# Patient Record
Sex: MALE | Race: WHITE | NOT HISPANIC OR LATINO | Employment: FULL TIME | URBAN - METROPOLITAN AREA
[De-identification: names, ages, dates, MRNs, and addresses within clinical notes are randomized per-mention and may not be internally consistent; named-entity substitution may affect disease eponyms.]

---

## 2017-10-16 ENCOUNTER — ALLSCRIPTS OFFICE VISIT (OUTPATIENT)
Dept: OTHER | Facility: OTHER | Age: 32
End: 2017-10-16

## 2017-10-16 LAB
BILIRUB UR QL STRIP: NORMAL
CLARITY UR: NORMAL
COLOR UR: YELLOW
GLUCOSE (HISTORICAL): NORMAL
HGB UR QL STRIP.AUTO: NORMAL
KETONES UR STRIP-MCNC: NORMAL MG/DL
LEUKOCYTE ESTERASE UR QL STRIP: NORMAL
NITRITE UR QL STRIP: NORMAL
PH UR STRIP.AUTO: 6.5 [PH]
PROT UR STRIP-MCNC: NORMAL MG/DL
SP GR UR STRIP.AUTO: 1.02
UROBILINOGEN UR QL STRIP.AUTO: 0.2

## 2017-11-14 ENCOUNTER — GENERIC CONVERSION - ENCOUNTER (OUTPATIENT)
Dept: OTHER | Facility: OTHER | Age: 32
End: 2017-11-14

## 2017-11-14 ENCOUNTER — ALLSCRIPTS OFFICE VISIT (OUTPATIENT)
Dept: OTHER | Facility: OTHER | Age: 32
End: 2017-11-14

## 2017-11-14 DIAGNOSIS — Z30.2 ENCOUNTER FOR STERILIZATION: ICD-10-CM

## 2017-11-14 PROCEDURE — 88302 TISSUE EXAM BY PATHOLOGIST: CPT | Performed by: UROLOGY

## 2017-11-15 ENCOUNTER — LAB REQUISITION (OUTPATIENT)
Dept: LAB | Facility: HOSPITAL | Age: 32
End: 2017-11-15
Payer: COMMERCIAL

## 2017-11-15 DIAGNOSIS — Z30.2 ENCOUNTER FOR STERILIZATION: ICD-10-CM

## 2017-12-26 DIAGNOSIS — Z30.8 ENCOUNTER FOR OTHER CONTRACEPTIVE MANAGEMENT: ICD-10-CM

## 2018-01-12 VITALS
SYSTOLIC BLOOD PRESSURE: 128 MMHG | BODY MASS INDEX: 40.43 KG/M2 | WEIGHT: 315 LBS | DIASTOLIC BLOOD PRESSURE: 88 MMHG | HEIGHT: 74 IN

## 2018-01-22 VITALS
SYSTOLIC BLOOD PRESSURE: 138 MMHG | DIASTOLIC BLOOD PRESSURE: 84 MMHG | HEIGHT: 74 IN | BODY MASS INDEX: 40.43 KG/M2 | WEIGHT: 315 LBS

## 2018-01-26 ENCOUNTER — TRANSCRIBE ORDERS (OUTPATIENT)
Dept: LAB | Facility: HOSPITAL | Age: 33
End: 2018-01-26

## 2018-01-26 ENCOUNTER — LAB (OUTPATIENT)
Dept: LAB | Facility: HOSPITAL | Age: 33
End: 2018-01-26
Attending: UROLOGY
Payer: COMMERCIAL

## 2018-01-26 DIAGNOSIS — Z30.8 ENCOUNTER FOR OTHER CONTRACEPTIVE MANAGEMENT: ICD-10-CM

## 2018-01-26 LAB
COMMENT POST VAS: ABNORMAL
PH SMN: 8.5 [PH] (ref 7.2–8.6)
POST  VAS COLLECTION: ABNORMAL
SPECIMEN VOL SMN: 4 ML (ref 1–5)
VISC SMN: 2 CP (ref 3–4)
WBC SMN QL: 2 "HPF"

## 2018-01-26 PROCEDURE — 89321 SEMEN ANAL SPERM DETECTION: CPT

## 2018-01-26 NOTE — PROGRESS NOTES
Inform pt that the  test was normal  Keep usual follow up appt  If this is his second neg Sa, he can be cleared

## 2018-01-30 ENCOUNTER — TELEPHONE (OUTPATIENT)
Dept: UROLOGY | Facility: AMBULATORY SURGERY CENTER | Age: 33
End: 2018-01-30

## 2018-02-14 DIAGNOSIS — Z30.8 ENCOUNTER FOR OTHER CONTRACEPTIVE MANAGEMENT: ICD-10-CM

## 2018-09-17 ENCOUNTER — OFFICE VISIT (OUTPATIENT)
Dept: UROLOGY | Facility: MEDICAL CENTER | Age: 33
End: 2018-09-17
Payer: COMMERCIAL

## 2018-09-17 VITALS
DIASTOLIC BLOOD PRESSURE: 90 MMHG | BODY MASS INDEX: 40.43 KG/M2 | HEIGHT: 74 IN | WEIGHT: 315 LBS | SYSTOLIC BLOOD PRESSURE: 120 MMHG

## 2018-09-17 DIAGNOSIS — N48.9 PENILE LESION: Primary | ICD-10-CM

## 2018-09-17 LAB
SL AMB  POCT GLUCOSE, UA: ABNORMAL
SL AMB LEUKOCYTE ESTERASE,UA: ABNORMAL
SL AMB POCT BILIRUBIN,UA: ABNORMAL
SL AMB POCT BLOOD,UA: ABNORMAL
SL AMB POCT CLARITY,UA: CLEAR
SL AMB POCT COLOR,UA: YELLOW
SL AMB POCT KETONES,UA: ABNORMAL
SL AMB POCT NITRITE,UA: ABNORMAL
SL AMB POCT PH,UA: 5.5
SL AMB POCT SPECIFIC GRAVITY,UA: 1.03
SL AMB POCT URINE PROTEIN: ABNORMAL
SL AMB POCT UROBILINOGEN: 0.2

## 2018-09-17 PROCEDURE — 81003 URINALYSIS AUTO W/O SCOPE: CPT | Performed by: UROLOGY

## 2018-09-17 PROCEDURE — 99214 OFFICE O/P EST MOD 30 MIN: CPT | Performed by: UROLOGY

## 2018-09-17 RX ORDER — ZOLPIDEM TARTRATE 10 MG/1
TABLET ORAL
COMMUNITY
Start: 2018-08-13

## 2018-09-17 RX ORDER — LISDEXAMFETAMINE DIMESYLATE 30 MG/1
20 CAPSULE ORAL 2 TIMES DAILY
COMMUNITY
Start: 2018-08-13 | End: 2020-10-19

## 2018-09-17 RX ORDER — BETAMETHASONE DIPROPIONATE 0.5 MG/G
OINTMENT TOPICAL 2 TIMES DAILY
Qty: 30 G | Refills: 0 | Status: SHIPPED | OUTPATIENT
Start: 2018-09-17 | End: 2019-06-25 | Stop reason: SDUPTHER

## 2018-09-17 NOTE — ASSESSMENT & PLAN NOTE
Likely papular lichen planus  Will treat empirically with steroid cream   If not better he will require derm consult or biopsy

## 2018-09-17 NOTE — PATIENT INSTRUCTIONS
Betamethasone Dipropionate (On the skin)   Betamethasone Dipropionate (bay-ta-METH-a-sone dye-PROE-pee-oh-bk)  Treats skin pain, swelling, and itching  This medicine is a corticosteroid  Brand Name(s):   There may be other brand names for this medicine  When This Medicine Should Not Be Used: This medicine is not right for everyone  Do not use it if you had an allergic reaction to betamethasone or to similar medicines  How to Use This Medicine:   Cream, Foam, Gel/Jelly, Lotion, Ointment, Spray  · Use this medicine as directed  · Use this medicine only on your skin  Rinse it off right away if it gets on a cut or scrape  Do not get the medicine in your eyes, nose, or mouth  · Wash your hands with soap and water before and after you use this medicine  · Cream, gel, lotion, or ointment: Apply a thin layer of the medicine to the affected area  Rub it in gently  · Aerosol foam: Turn the can upside down and squirt a small amount onto a small plate or other cool surface  Then  small amounts of the foam and massage it into your scalp  Do not squirt the foam onto your hands because it will melt if you hold it too long  · Do not cover the treated area with a bandage unless directed by your doctor  · Missed dose: Apply a dose as soon as you can  If it is almost time for your next dose, wait until then and apply a regular dose  Do not apply extra medicine to make up for a missed dose  · Store the medicine at room temperature, away from heat and direct light  · The aerosol foam is flammable  Do not use it near heat, open flame, or while smoking  Do not puncture, break, or burn the aerosol can  Drugs and Foods to Avoid:      Ask your doctor or pharmacist before using any other medicine, including over-the-counter medicines, vitamins, and herbal products  Warnings While Using This Medicine:   · Tell your doctor if you are pregnant or breastfeeding    · This medicine may cause adrenal gland problems, such as Cushing syndrome or high blood sugar  · Do not use this medicine to treat a skin problem your doctor has not examined  · If your symptoms do not improve after 2 weeks of treatment, or if they get worse, check with your doctor  · Keep all medicine out of the reach of children  Never share your medicine with anyone  Possible Side Effects While Using This Medicine:   Call your doctor right away if you notice any of these side effects:  · Allergic reaction: Itching or hives, swelling in your face or hands, swelling or tingling in your mouth or throat, chest tightness, trouble breathing  · Color changes on the skin, dark freckles, easy bruising, muscle weakness  · Severe itching, burning, or skin irritation  · Signs of skin infection, such as redness, swelling, drainage, or pus  · Weight gain around your neck, upper back, breast, face, or waist  If you notice these less serious side effects, talk with your doctor:   · Mild stinging, burning, itching, redness, or dryness at the application site  If you notice other side effects that you think are caused by this medicine, tell your doctor  Call your doctor for medical advice about side effects  You may report side effects to FDA at 0-700-FDA-0730  © 2017 [employer] Information is for End User's use only and may not be sold, redistributed or otherwise used for commercial purposes  The above information is an  only  It is not intended as medical advice for individual conditions or treatments  Talk to your doctor, nurse or pharmacist before following any medical regimen to see if it is safe and effective for you

## 2018-09-17 NOTE — PROGRESS NOTES
Assessment/Plan:    Penile lesion  Likely papular lichen planus  Will treat empirically with steroid cream   If not better he will require derm consult or biopsy  Diagnoses and all orders for this visit:    Penile lesion  -     POCT urine dip auto non-scope  -     betamethasone, augmented, (DIPROLENE) 0 05 % ointment; Apply topically 2 (two) times a day    Other orders  -     VYVANSE 30 MG capsule; Earliest Fill Date: 8/13/18   -     zolpidem (AMBIEN) 10 mg tablet;           Subjective:      Patient ID: Young Bowles is a 34 y o  male  Chief complaint:  Penile lesion    79-year-old male who underwent vasectomy last year who returns with a penile lesion  This is been present for the last 2-3 weeks  It is at times itchy  No fever, chills, new exposures are noted  He is voiding well  There is no gross hematuria, dysuria or symptoms of infection  There is no urethral discharge  The following portions of the patient's history were reviewed and updated as appropriate: allergies, current medications, past family history, past medical history, past social history, past surgical history and problem list     Review of Systems   Constitutional: Negative for chills, diaphoresis, fatigue and fever  HENT: Negative  Eyes: Negative  Respiratory: Negative  Cardiovascular: Negative  Endocrine: Negative  Genitourinary: Negative  Musculoskeletal: Negative  Skin: Negative  Allergic/Immunologic: Negative  Neurological: Negative  Hematological: Negative  Psychiatric/Behavioral: Negative  Objective:      /90 (BP Location: Left arm, Patient Position: Sitting)   Ht 6' 2" (1 88 m)   Wt (!) 158 kg (348 lb)   BMI 44 68 kg/m²          Physical Exam   Constitutional: He is oriented to person, place, and time  He appears well-developed and well-nourished  HENT:   Head: Normocephalic and atraumatic  Eyes: Conjunctivae are normal    Neck: Neck supple     Cardiovascular: Normal rate  Pulmonary/Chest: Effort normal    Abdominal: He exhibits no distension and no mass  There is no tenderness  There is no rebound and no guarding  No hernia   Genitourinary:   Genitourinary Comments: Penis is circumcised  Well deep yaw aided, flat, papular lesions on the glans penis- mildly erythematous  Lesions are also noted on his circumcision line on the left side of the shaft  No blisters, erosions or discharge  Testes descended and normal to palpation   Neurological: He is alert and oriented to person, place, and time  Skin: Skin is warm and dry  Psychiatric: He has a normal mood and affect   His behavior is normal  Judgment and thought content normal

## 2019-06-24 ENCOUNTER — TELEPHONE (OUTPATIENT)
Dept: UROLOGY | Facility: MEDICAL CENTER | Age: 34
End: 2019-06-24

## 2019-06-24 DIAGNOSIS — N48.9 PENILE LESION: ICD-10-CM

## 2019-06-25 RX ORDER — BETAMETHASONE DIPROPIONATE 0.5 MG/G
OINTMENT TOPICAL 2 TIMES DAILY
Qty: 30 G | Refills: 0 | Status: SHIPPED | OUTPATIENT
Start: 2019-06-25 | End: 2020-10-19 | Stop reason: SDUPTHER

## 2020-01-17 ENCOUNTER — OFFICE VISIT (OUTPATIENT)
Dept: FAMILY MEDICINE CLINIC | Facility: CLINIC | Age: 35
End: 2020-01-17
Payer: COMMERCIAL

## 2020-01-17 VITALS
SYSTOLIC BLOOD PRESSURE: 126 MMHG | TEMPERATURE: 98.6 F | DIASTOLIC BLOOD PRESSURE: 84 MMHG | WEIGHT: 309.2 LBS | HEART RATE: 72 BPM | RESPIRATION RATE: 18 BRPM | OXYGEN SATURATION: 97 % | HEIGHT: 75 IN | BODY MASS INDEX: 38.44 KG/M2

## 2020-01-17 DIAGNOSIS — G47.30 SLEEP APNEA, UNSPECIFIED TYPE: ICD-10-CM

## 2020-01-17 DIAGNOSIS — Z23 NEED FOR TDAP VACCINATION: ICD-10-CM

## 2020-01-17 DIAGNOSIS — R06.83 SNORING: ICD-10-CM

## 2020-01-17 DIAGNOSIS — Z00.00 PREVENTATIVE HEALTH CARE: Primary | ICD-10-CM

## 2020-01-17 PROBLEM — E66.01 SEVERE OBESITY (BMI >= 40) (HCC): Status: ACTIVE | Noted: 2019-07-24

## 2020-01-17 PROCEDURE — 90471 IMMUNIZATION ADMIN: CPT | Performed by: FAMILY MEDICINE

## 2020-01-17 PROCEDURE — 99385 PREV VISIT NEW AGE 18-39: CPT | Performed by: FAMILY MEDICINE

## 2020-01-17 PROCEDURE — 90715 TDAP VACCINE 7 YRS/> IM: CPT | Performed by: FAMILY MEDICINE

## 2020-01-17 RX ORDER — ATOMOXETINE 40 MG/1
CAPSULE ORAL
COMMUNITY
Start: 2019-12-03 | End: 2020-02-26

## 2020-01-17 NOTE — PROGRESS NOTES
Assessment/Plan:   1  Preventative health care  Patient appears well today  Reviewed health maintenance measures with him  At this time, he was instructed on importance of maintaining a very strict diet and exercise plan  Review need for immunizations  He is due today for his Adacel vaccine  This was given to him today  He states that he will be having blood work for [facility]  Will request this when he completes this  Follow up with patient in 1 year or p r n  2  Snoring/Sleep apnea, unspecified type  Reviewed patient's symptoms today  At this time, symptoms appear largely concerning for obstructive sleep apnea  He was educated on pathophysiology is problem  He was advised he would highly benefit from having a home sleep study  Order was placed  - Home Study; Future    3  Need for Tdap vaccination  - TDAP VACCINE GREATER THAN OR EQUAL TO 6YO IM    BMI Counseling: Body mass index is 39 17 kg/m²  The BMI is above normal  Nutrition recommendations include decreasing portion sizes, encouraging healthy choices of fruits and vegetables, decreasing fast food intake, consuming healthier snacks and limiting drinks that contain sugar  Exercise recommendations include moderate physical activity 150 minutes/week and exercising 3-5 times per week  No pharmacotherapy was ordered  There are no diagnoses linked to this encounter  Subjective:       Chief Complaint   Patient presents with    Physical Exam     New Pt       Patient ID: Marni Prado is a 30 y o  male  Marni Prado presents for health maintenance visit   30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    He/she states that  level of health is:  good     Dental issues :no    Vision issues: no    Hearing issues: no    Up-to-date on immunizations: yes    Diet: good     Exercise:  intermittently    Tobacco: no    ETOH: Minimal     Illegal drugs: Marijuana occasionally           Review of Systems   Constitutional: Negative for activity change, chills, fatigue and fever  HENT: Negative for congestion, ear pain, sinus pressure and sore throat  Eyes: Negative for redness, itching and visual disturbance  Respiratory: Negative for cough and shortness of breath  Cardiovascular: Negative for chest pain and palpitations  Gastrointestinal: Negative for abdominal pain, diarrhea and nausea  Endocrine: Negative for cold intolerance and heat intolerance  Genitourinary: Negative for dysuria, flank pain and frequency  Musculoskeletal: Negative for arthralgias, back pain, gait problem and myalgias  Skin: Negative for color change  Allergic/Immunologic: Negative for environmental allergies  Neurological: Negative for dizziness, numbness and headaches  Psychiatric/Behavioral: Negative for behavioral problems and sleep disturbance  The following portions of the patient's history were reviewed and updated as appropriate : past family history, past medical history, past social history and past surgical history  Current Outpatient Medications:     VYVANSE 30 MG capsule, 20 mg 2 (two) times a day , Disp: , Rfl:     atoMOXetine (STRATTERA) 40 mg capsule, , Disp: , Rfl:     betamethasone, augmented, (DIPROLENE) 0 05 % ointment, Apply topically 2 (two) times a day (Patient not taking: Reported on 1/17/2020), Disp: 30 g, Rfl: 0    zolpidem (AMBIEN) 10 mg tablet, , Disp: , Rfl:          Objective:         Vitals:    01/17/20 0940   BP: 126/84   BP Location: Left arm   Patient Position: Sitting   Cuff Size: Large   Pulse: 72   Resp: 18   Temp: 98 6 °F (37 °C)   TempSrc: Tympanic   SpO2: 97%   Weight: (!) 140 kg (309 lb 3 2 oz)   Height: 6' 2 5" (1 892 m)     Physical Exam   Constitutional: He is oriented to person, place, and time  He appears well-developed and well-nourished  HENT:   Head: Normocephalic and atraumatic  Nose: Nose normal    Mouth/Throat: No oropharyngeal exudate     Eyes: Pupils are equal, round, and reactive to light  Right eye exhibits no discharge  Left eye exhibits no discharge  Neck: Normal range of motion  Neck supple  No tracheal deviation present  Cardiovascular: Normal rate, regular rhythm and intact distal pulses  Exam reveals no gallop and no friction rub  No murmur heard  Pulses:       Dorsalis pedis pulses are 2+ on the right side, and 2+ on the left side  Posterior tibial pulses are 2+ on the right side, and 2+ on the left side  Pulmonary/Chest: Effort normal and breath sounds normal  No respiratory distress  He has no wheezes  He has no rales  Abdominal: Soft  Bowel sounds are normal  He exhibits no distension  There is no tenderness  There is no rebound and no guarding  Musculoskeletal: Normal range of motion  He exhibits no edema  Lymphadenopathy:        Head (right side): No submental and no submandibular adenopathy present  Head (left side): No submental and no submandibular adenopathy present  He has no cervical adenopathy  Right cervical: No superficial cervical, no deep cervical and no posterior cervical adenopathy present  Left cervical: No superficial cervical, no deep cervical and no posterior cervical adenopathy present  Neurological: He is alert and oriented to person, place, and time  No cranial nerve deficit or sensory deficit  Skin: Skin is warm, dry and intact  Psychiatric: His speech is normal and behavior is normal  Judgment normal  His mood appears not anxious  Cognition and memory are normal  He does not exhibit a depressed mood  Vitals reviewed

## 2020-01-29 ENCOUNTER — TELEPHONE (OUTPATIENT)
Dept: SLEEP CENTER | Facility: CLINIC | Age: 35
End: 2020-01-29

## 2020-01-29 NOTE — TELEPHONE ENCOUNTER
----- Message from Jae Shepherd DO sent at 1/28/2020  3:47 PM EST -----  Chart reviewed  Study approved   ----- Message -----  From: Reji Hastings MA  Sent: 1/27/2020   4:20 PM EST  To: Sleep Medicine [facility] Provider    This sleep study needs approval      If approved please sign and return to clerical pool  If denied please include reasons why  Also provide alternative testing if warranted  Please sign and return to clerical pool

## 2020-02-03 ENCOUNTER — TELEPHONE (OUTPATIENT)
Dept: SLEEP CENTER | Facility: CLINIC | Age: 35
End: 2020-02-03

## 2020-02-03 ENCOUNTER — HOSPITAL ENCOUNTER (OUTPATIENT)
Dept: SLEEP CENTER | Facility: CLINIC | Age: 35
Discharge: HOME | End: 2020-02-03
Payer: COMMERCIAL

## 2020-02-03 DIAGNOSIS — R06.83 SNORING: ICD-10-CM

## 2020-02-03 DIAGNOSIS — G47.30 SLEEP APNEA, UNSPECIFIED TYPE: ICD-10-CM

## 2020-02-03 PROCEDURE — G0399 HOME SLEEP TEST/TYPE 3 PORTA: HCPCS

## 2020-02-03 PROCEDURE — G0399 HOME SLEEP TEST/TYPE 3 PORTA: HCPCS | Performed by: INTERNAL MEDICINE

## 2020-02-04 NOTE — PROGRESS NOTES
Home Sleep Study Documentation    Pre-Sleep Home Study:    Set-up and instructions performed by: JUANITA Guaman    Technician performed demonstration for Patient: yes    Return demonstration performed by Patient: yes    Written instructions provided to Patient: yes    Patient signed consent form: yes        Post-Sleep Home Study:    Additional comments by Patient: None    Home Sleep Study Failed:no:    Failure reason: N/A    Reported or Detected: N/A    Scored by: MENDEZ Best

## 2020-02-07 ENCOUNTER — TELEPHONE (OUTPATIENT)
Dept: SLEEP CENTER | Facility: CLINIC | Age: 35
End: 2020-02-07

## 2020-02-07 NOTE — TELEPHONE ENCOUNTER
Advised patient sleep study shows severe KENDRA   Scheduled first available consult with Dr Richard Cao

## 2020-02-25 ENCOUNTER — OFFICE VISIT (OUTPATIENT)
Dept: FAMILY MEDICINE CLINIC | Facility: CLINIC | Age: 35
End: 2020-02-25
Payer: COMMERCIAL

## 2020-02-25 VITALS
OXYGEN SATURATION: 98 % | SYSTOLIC BLOOD PRESSURE: 128 MMHG | HEART RATE: 72 BPM | HEIGHT: 75 IN | RESPIRATION RATE: 19 BRPM | TEMPERATURE: 98.7 F | DIASTOLIC BLOOD PRESSURE: 88 MMHG | WEIGHT: 310.6 LBS | BODY MASS INDEX: 38.62 KG/M2

## 2020-02-25 DIAGNOSIS — J06.9 ACUTE URI: Primary | ICD-10-CM

## 2020-02-25 DIAGNOSIS — F90.9 ATTENTION DEFICIT HYPERACTIVITY DISORDER (ADHD), UNSPECIFIED ADHD TYPE: ICD-10-CM

## 2020-02-25 PROCEDURE — 99214 OFFICE O/P EST MOD 30 MIN: CPT | Performed by: FAMILY MEDICINE

## 2020-02-25 PROCEDURE — 1036F TOBACCO NON-USER: CPT | Performed by: FAMILY MEDICINE

## 2020-02-25 PROCEDURE — 3008F BODY MASS INDEX DOCD: CPT | Performed by: FAMILY MEDICINE

## 2020-02-25 NOTE — PROGRESS NOTES
Assessment/Plan:   1  Acute URI  Reviewed patient's symptoms today  Symptoms appear likely secondary to a possible upper respiratory infection  This may likely be viral   Continue supportive care  Maintain hydration  Take over-the-counter Mucinex  If any symptoms are persisting, he was advised to call or follow up  2  Attention deficit hyperactivity disorder (ADHD), unspecified ADHD type  Reviewed patient's symptoms today  Symptoms appear very well controlled with his current treatment of Vyvanse  He has been following up with Psychiatry regularly  He states that since his medications have been stable, he would like to consider transferring his care from Psychiatry to this office  Will request his records for further evaluation  He has been weaning down on his Vyvanse  Will request these records 1st to further assess his control              There are no diagnoses linked to this encounter  Subjective:       Chief Complaint   Patient presents with    Influenza     Flu sxs       Patient ID: Andrea West is a 30 y o  male  URI    This is a new problem  The current episode started in the past 7 days (4 days ago)  The problem has been unchanged  There has been no fever  Associated symptoms include congestion, coughing, rhinorrhea, sinus pain and a sore throat  Pertinent negatives include no abdominal pain, chest pain, diarrhea, dysuria, ear pain, headaches or nausea  Treatments tried: tylenol cold and flu  The treatment provided mild relief  Review of Systems   Constitutional: Negative for activity change, chills, fatigue and fever  HENT: Positive for congestion, rhinorrhea, sinus pain and sore throat  Negative for ear pain and sinus pressure  Eyes: Negative for redness, itching and visual disturbance  Respiratory: Positive for cough  Negative for shortness of breath  Cardiovascular: Negative for chest pain and palpitations     Gastrointestinal: Negative for abdominal pain, diarrhea and nausea  Endocrine: Negative for cold intolerance and heat intolerance  Genitourinary: Negative for dysuria, flank pain and frequency  Musculoskeletal: Negative for arthralgias, back pain, gait problem and myalgias  Skin: Negative for color change  Allergic/Immunologic: Negative for environmental allergies  Neurological: Negative for dizziness, numbness and headaches  Psychiatric/Behavioral: Negative for behavioral problems and sleep disturbance  The following portions of the patient's history were reviewed and updated as appropriate : past family history, past medical history, past social history and past surgical history  Current Outpatient Medications:     VYVANSE 30 MG capsule, 20 mg 2 (two) times a day , Disp: , Rfl:     atoMOXetine (STRATTERA) 40 mg capsule, , Disp: , Rfl:     betamethasone, augmented, (DIPROLENE) 0 05 % ointment, Apply topically 2 (two) times a day (Patient not taking: Reported on 1/17/2020), Disp: 30 g, Rfl: 0    zolpidem (AMBIEN) 10 mg tablet, , Disp: , Rfl:          Objective:         Vitals:    02/25/20 1220   BP: 128/88   BP Location: Left arm   Patient Position: Sitting   Cuff Size: Large   Pulse: 72   Resp: 19   Temp: 98 7 °F (37 1 °C)   TempSrc: Tympanic   SpO2: 98%   Weight: (!) 141 kg (310 lb 9 6 oz)   Height: 6' 2 5" (1 892 m)     Physical Exam   Constitutional: He is oriented to person, place, and time  He appears well-developed and well-nourished  HENT:   Head: Normocephalic and atraumatic  Nose: Nose normal    Mouth/Throat: No oropharyngeal exudate  Eyes: Pupils are equal, round, and reactive to light  Right eye exhibits no discharge  Left eye exhibits no discharge  Neck: Normal range of motion  Neck supple  No tracheal deviation present  Cardiovascular: Normal rate, regular rhythm and intact distal pulses  Exam reveals no gallop and no friction rub  No murmur heard    Pulses:       Dorsalis pedis pulses are 2+ on the right side, and 2+ on the left side  Posterior tibial pulses are 2+ on the right side, and 2+ on the left side  Pulmonary/Chest: Effort normal and breath sounds normal  No respiratory distress  He has no wheezes  He has no rales  Abdominal: Soft  Bowel sounds are normal  He exhibits no distension  There is no tenderness  There is no rebound and no guarding  Musculoskeletal: Normal range of motion  He exhibits no edema  Lymphadenopathy:        Head (right side): No submental and no submandibular adenopathy present  Head (left side): No submental and no submandibular adenopathy present  He has no cervical adenopathy  Right cervical: No superficial cervical, no deep cervical and no posterior cervical adenopathy present  Left cervical: No superficial cervical, no deep cervical and no posterior cervical adenopathy present  Neurological: He is alert and oriented to person, place, and time  No cranial nerve deficit or sensory deficit  Skin: Skin is warm, dry and intact  Psychiatric: His speech is normal and behavior is normal  Judgment normal  His mood appears not anxious  Cognition and memory are normal  He does not exhibit a depressed mood  Vitals reviewed

## 2020-03-02 ENCOUNTER — APPOINTMENT (OUTPATIENT)
Dept: RADIOLOGY | Facility: CLINIC | Age: 35
End: 2020-03-02
Payer: COMMERCIAL

## 2020-03-02 ENCOUNTER — OFFICE VISIT (OUTPATIENT)
Dept: FAMILY MEDICINE CLINIC | Facility: CLINIC | Age: 35
End: 2020-03-02
Payer: COMMERCIAL

## 2020-03-02 VITALS
TEMPERATURE: 98.1 F | OXYGEN SATURATION: 97 % | SYSTOLIC BLOOD PRESSURE: 140 MMHG | DIASTOLIC BLOOD PRESSURE: 80 MMHG | HEART RATE: 68 BPM | WEIGHT: 315 LBS | HEIGHT: 74 IN | BODY MASS INDEX: 40.43 KG/M2
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
## 2020-03-02 DIAGNOSIS — M54.2 MUSCLE PAIN, CERVICAL: ICD-10-CM

## 2020-03-02 PROCEDURE — 72040 X-RAY EXAM NECK SPINE 2-3 VW: CPT

## 2020-03-02 PROCEDURE — 99214 OFFICE O/P EST MOD 30 MIN: CPT | Performed by: NURSE PRACTITIONER

## 2020-03-02 RX ORDER — METHOCARBAMOL 500 MG/1
500 TABLET, FILM COATED ORAL 4 TIMES DAILY
Qty: 30 TABLET | Refills: 0 | Status: SHIPPED | OUTPATIENT
Start: 2020-03-02 | End: 2021-01-15
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
## 2020-04-16 ENCOUNTER — TELEMEDICINE (OUTPATIENT)
Dept: SLEEP CENTER | Facility: CLINIC | Age: 35
End: 2020-04-16
Payer: COMMERCIAL

## 2020-04-16 VITALS — BODY MASS INDEX: 40.43 KG/M2 | HEIGHT: 74 IN | WEIGHT: 315 LBS

## 2020-04-16 DIAGNOSIS — E66.01 SEVERE OBESITY (BMI >= 40) (HCC): ICD-10-CM

## 2020-04-16 DIAGNOSIS — G47.33 OBSTRUCTIVE SLEEP APNEA: Primary | ICD-10-CM

## 2020-04-16 DIAGNOSIS — F98.8 ATTENTION DEFICIT DISORDER, UNSPECIFIED HYPERACTIVITY PRESENCE: ICD-10-CM

## 2020-04-16 PROCEDURE — 99215 OFFICE O/P EST HI 40 MIN: CPT | Performed by: PSYCHIATRY & NEUROLOGY

## 2020-04-16 PROCEDURE — 3008F BODY MASS INDEX DOCD: CPT | Performed by: PSYCHIATRY & NEUROLOGY

## 2020-04-28 DIAGNOSIS — G47.33 OBSTRUCTIVE SLEEP APNEA: Primary | ICD-10-CM

## 2020-04-29 ENCOUNTER — TELEPHONE (OUTPATIENT)
Dept: SLEEP CENTER | Facility: CLINIC | Age: 35
End: 2020-04-29

## 2020-10-19 ENCOUNTER — OFFICE VISIT (OUTPATIENT)
Dept: FAMILY MEDICINE CLINIC | Facility: CLINIC | Age: 35
End: 2020-10-19
Payer: COMMERCIAL

## 2020-10-19 ENCOUNTER — APPOINTMENT (OUTPATIENT)
Dept: LAB | Facility: CLINIC | Age: 35
End: 2020-10-19
Payer: COMMERCIAL

## 2020-10-19 VITALS
TEMPERATURE: 98.2 F | BODY MASS INDEX: 40.43 KG/M2 | HEIGHT: 74 IN | SYSTOLIC BLOOD PRESSURE: 124 MMHG | WEIGHT: 315 LBS | OXYGEN SATURATION: 98 % | DIASTOLIC BLOOD PRESSURE: 86 MMHG | HEART RATE: 80 BPM

## 2020-10-19 DIAGNOSIS — N48.9 PENILE LESION: Primary | ICD-10-CM

## 2020-10-19 DIAGNOSIS — Z11.3 SCREEN FOR STD (SEXUALLY TRANSMITTED DISEASE): ICD-10-CM

## 2020-10-19 DIAGNOSIS — N48.9 PENILE LESION: ICD-10-CM

## 2020-10-19 PROCEDURE — 87389 HIV-1 AG W/HIV-1&-2 AB AG IA: CPT

## 2020-10-19 PROCEDURE — 86696 HERPES SIMPLEX TYPE 2 TEST: CPT

## 2020-10-19 PROCEDURE — 1036F TOBACCO NON-USER: CPT | Performed by: FAMILY MEDICINE

## 2020-10-19 PROCEDURE — 86695 HERPES SIMPLEX TYPE 1 TEST: CPT

## 2020-10-19 PROCEDURE — 86592 SYPHILIS TEST NON-TREP QUAL: CPT

## 2020-10-19 PROCEDURE — 87591 N.GONORRHOEAE DNA AMP PROB: CPT

## 2020-10-19 PROCEDURE — 36415 COLL VENOUS BLD VENIPUNCTURE: CPT

## 2020-10-19 PROCEDURE — 87491 CHLMYD TRACH DNA AMP PROBE: CPT

## 2020-10-19 PROCEDURE — 99213 OFFICE O/P EST LOW 20 MIN: CPT | Performed by: FAMILY MEDICINE

## 2020-10-19 RX ORDER — LISDEXAMFETAMINE DIMESYLATE 20 MG/1
CAPSULE ORAL
COMMUNITY
Start: 2020-09-16 | End: 2021-01-15 | Stop reason: SDUPTHER

## 2020-10-19 RX ORDER — BETAMETHASONE DIPROPIONATE 0.5 MG/G
OINTMENT TOPICAL 2 TIMES DAILY
Qty: 30 G | Refills: 0 | Status: SHIPPED | OUTPATIENT
Start: 2020-10-19 | End: 2021-01-15

## 2020-10-20 LAB
HIV 1+2 AB+HIV1 P24 AG SERPL QL IA: NORMAL
HSV1 IGG SER IA-ACNC: 1.96 INDEX (ref 0–0.9)
HSV2 IGG SER IA-ACNC: <0.91 INDEX (ref 0–0.9)
RPR SER QL: NORMAL

## 2020-10-22 LAB
C TRACH DNA SPEC QL NAA+PROBE: NEGATIVE
N GONORRHOEA DNA SPEC QL NAA+PROBE: NEGATIVE

## 2021-01-15 ENCOUNTER — OFFICE VISIT (OUTPATIENT)
Dept: FAMILY MEDICINE CLINIC | Facility: CLINIC | Age: 36
End: 2021-01-15
Payer: COMMERCIAL

## 2021-01-15 VITALS
RESPIRATION RATE: 18 BRPM | HEART RATE: 71 BPM | OXYGEN SATURATION: 98 % | HEIGHT: 74 IN | WEIGHT: 315 LBS | TEMPERATURE: 98.5 F | DIASTOLIC BLOOD PRESSURE: 80 MMHG | BODY MASS INDEX: 40.43 KG/M2 | SYSTOLIC BLOOD PRESSURE: 120 MMHG

## 2021-01-15 DIAGNOSIS — F90.9 ATTENTION DEFICIT HYPERACTIVITY DISORDER (ADHD), UNSPECIFIED ADHD TYPE: Primary | ICD-10-CM

## 2021-01-15 DIAGNOSIS — Z13.29 SCREENING FOR THYROID DISORDER: ICD-10-CM

## 2021-01-15 DIAGNOSIS — R55 SYNCOPE AND COLLAPSE: ICD-10-CM

## 2021-01-15 DIAGNOSIS — Z13.0 SCREENING FOR IRON DEFICIENCY ANEMIA: ICD-10-CM

## 2021-01-15 DIAGNOSIS — Z13.1 SCREENING FOR DIABETES MELLITUS: ICD-10-CM

## 2021-01-15 DIAGNOSIS — Z13.220 SCREENING FOR CHOLESTEROL LEVEL: ICD-10-CM

## 2021-01-15 DIAGNOSIS — Z23 NEED FOR PROPHYLACTIC VACCINATION AND INOCULATION AGAINST INFLUENZA: ICD-10-CM

## 2021-01-15 PROCEDURE — 90471 IMMUNIZATION ADMIN: CPT

## 2021-01-15 PROCEDURE — 3008F BODY MASS INDEX DOCD: CPT

## 2021-01-15 PROCEDURE — 90682 RIV4 VACC RECOMBINANT DNA IM: CPT

## 2021-01-15 PROCEDURE — 99214 OFFICE O/P EST MOD 30 MIN: CPT

## 2021-01-15 PROCEDURE — 1036F TOBACCO NON-USER: CPT

## 2021-01-15 RX ORDER — LISDEXAMFETAMINE DIMESYLATE 20 MG/1
20 CAPSULE ORAL 2 TIMES DAILY
Qty: 60 CAPSULE | Refills: 0 | Status: SHIPPED | OUTPATIENT
Start: 2021-01-15 | End: 2021-04-21

## 2021-01-15 NOTE — PROGRESS NOTES
Assessment/Plan:   1  Attention deficit hyperactivity disorder (ADHD), unspecified ADHD type   patient was previously seeing Psychiatry  His symptoms have been very well controlled  At this time, he has been weaning down on his Vyvanse  He states that he is at a stable dose of 20 mg b i d  [employer] Refills given today  PDMP did not show any abnormalities  Follow up with patient in 6 months  - Vyvanse 20 MG capsule; Take 1 capsule (20 mg total) by mouth 2 (two) times a dayMax Daily Amount: 40 mg  Dispense: 60 capsule; Refill: 0  - CBC; Future  - Comprehensive metabolic panel; Future  - Lipid Panel with Direct LDL reflex; Future  - TSH, 3rd generation with Free T4 reflex; Future  - Hemoglobin A1C; Future    2  Syncope and collapse    Is unclear as to the exact cause of patient's syncope and collapse  At this time, given his activity a power lifting when this happened, it appears that is likely a vasovagal event  He has had previous cardiac evaluations  His stress test most recently was in 2019 which appeared normal   He appears hemodynamically stable as well as asymptomatic today  Will continue with routine monitor  Will check blood work including CBC, CMP, lipid panel, TSH as well as A1c  If any symptoms should worsen, he was advised to follow up  - CBC; Future  - Comprehensive metabolic panel; Future  - Lipid Panel with Direct LDL reflex; Future  - TSH, 3rd generation with Free T4 reflex; Future  - Hemoglobin A1C; Future         Diagnoses and all orders for this visit:    Need for prophylactic vaccination and inoculation against influenza  -     FLUBLOK: influenza vaccine, quadrivalent, recombinant, PF, 0 5 mL    Other orders  -     Vyvanse 20 MG capsule; Take 1 capsule (20 mg total) by mouth every morningMax Daily Amount: 20 mg          Subjective:       Chief Complaint   Patient presents with    Follow-up     anxiety, depression       Patient ID: Jay Jay Sales is a 37 y o  male      HPI    Review of Systems    The following portions of the patient's history were reviewed and updated as appropriate : past family history, past medical history, past social history and past surgical history  Current Outpatient Medications:      Vyvanse 20 MG capsule, , Disp: , Rfl:     betamethasone, augmented, (DIPROLENE) 0 05 % ointment, Apply topically 2 (two) times a day (Patient not taking: Reported on 1/15/2021), Disp: 30 g, Rfl: 0    methocarbamol (ROBAXIN) 500 mg tablet, Take 1 tablet (500 mg total) by mouth 4 (four) times a day (Patient not taking: Reported on 1/15/2021), Disp: 30 tablet, Rfl: 0    zolpidem (AMBIEN) 10 mg tablet, , Disp: , Rfl:          Objective:         Vitals:    01/15/21 1134   BP: 120/80   BP Location: Right arm   Patient Position: Sitting   Cuff Size: Large   Pulse: 71   Resp: 18   Temp: 98 5 °F (36 9 °C)   TempSrc: Tympanic   SpO2: 98%   Weight: (!) 150 kg (330 lb)   Height: 6' 2" (1 88 m)     Physical Exam

## 2021-02-08 ENCOUNTER — TELEMEDICINE (OUTPATIENT)
Dept: FAMILY MEDICINE CLINIC | Facility: CLINIC | Age: 36
End: 2021-02-08
Payer: COMMERCIAL

## 2021-02-08 DIAGNOSIS — B34.9 VIRAL ILLNESS: ICD-10-CM

## 2021-02-08 DIAGNOSIS — Z20.822 EXPOSURE TO COVID-19 VIRUS: Primary | ICD-10-CM

## 2021-02-08 PROCEDURE — 99214 OFFICE O/P EST MOD 30 MIN: CPT | Performed by: NURSE PRACTITIONER

## 2021-02-08 NOTE — PROGRESS NOTES
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      I have spent 7 minutes directly with the patient  Greater than 50% of this time was spent in counseling/coordination of care regarding: risk factor reductions  Encounter provider ANNA Park    Provider located at [facility]  [address] [facility] [address] [place] [address]  [phone]    Recent Visits  No visits were found meeting these conditions  Showing recent visits within past 7 days and meeting all other requirements     Today's Visits  Date Type Provider Dept   02/08/21 Telemedicine [place]NA [place] [id number] [facility] [place] today's visits and meeting all other requirements     Future Appointments  No visits were found meeting these conditions  Showing future appointments within next 150 days and meeting all other requirements      This virtual check-in was done via [employer] and patient was informed that this is a secure, HIPAA-compliant platform  He agrees to proceed  Patient agrees to participate in a virtual check in via telephone or video visit instead of presenting to the office to address urgent/immediate medical needs  Patient is aware this is a billable service  After connecting through [facility], the patient was identified by name and date of birth   Diana Garrido was informed that this was a telemedicine visit and that the exam was being conducted confidentially over secure lines  My office door was closed  No one else was in the room  Ni Balderrama acknowledged consent and understanding of privacy and security of the telemedicine visit  I informed the patient that I have reviewed his record in Epic and presented the opportunity for him to ask any questions regarding the visit today  The patient agreed to participate  Subjective:   Ni Balderrama is a 37 y o  male who is concerned about COVID-19  Patient's symptoms include nasal congestion (improving since setting up humidifier) and headache  Date of symptom onset: 2/6/2021  Date of exposure: 2/3/2021    Exposure:   Contact with a person who is under investigation (PUI) for or who is positive for COVID-19 within the last 14 days?: Yes    Hospitalized recently for fever and/or lower respiratory symptoms?: No      Currently a healthcare worker that is involved in direct patient care?: No      Works in a special setting where the risk of COVID-19 transmission may be high? (this may include long-term care, correctional and senior living facilities; homeless shelters; assisted-living facilities and group homes ): No      Resident in a special setting where the risk of COVID-19 transmission may be high? (this may include long-term care, correctional and senior living facilities; homeless shelters; assisted-living facilities and group homes ): No      With sister 2/3  Sister symptomatic (flu-like symptoms) and tested positive 2/8  Pt symptoms started 2/6: sneezing, runny  nose, congestion, HA  Symptoms improving since setting up humidifier   HA persists    No results found for: Jay Primrose, SARSCORONAVI, [address]  Past Medical History:   Diagnosis Date    Epididymitis     Hypertension     Orchalgia     Renal failure      Past Surgical History:   Procedure Laterality Date    VASECTOMY  11/14/2017     Current Outpatient Medications   Medication Sig Dispense Refill    Vyvanse 20 MG capsule Take 1 capsule (20 mg total) by mouth 2 (two) times a dayMax Daily Amount: 40 mg 60 capsule 0    zolpidem (AMBIEN) 10 mg tablet        No current facility-administered medications for this visit  Allergies   Allergen Reactions    Lactose        Review of Systems   HENT: Positive for congestion (improving since setting up humidifier)  Neurological: Positive for headaches  Objective: There were no vitals filed for this visit  Physical Exam  Constitutional:       General: He is not in acute distress  Appearance: Normal appearance  He is not ill-appearing  Pulmonary:      Effort: Pulmonary effort is normal  No respiratory distress  Neurological:      Mental Status: He is alert and oriented to person, place, and time  Psychiatric:         Attention and Perception: Attention normal          Mood and Affect: Mood normal          Speech: Speech normal          Behavior: Behavior normal        VIRTUAL VISIT DISCLAIMER    Ya Lofton acknowledges that he has consented to an online visit or consultation  He understands that the online visit is based solely on information provided by him, and that, in the absence of a face-to-face physical evaluation by the physician, the diagnosis he receives is both limited and provisional in terms of accuracy and completeness  This is not intended to replace a full medical face-to-face evaluation by the physician  Ya Lofton understands and accepts these terms

## 2021-02-09 DIAGNOSIS — Z20.822 EXPOSURE TO COVID-19 VIRUS: ICD-10-CM

## 2021-02-09 DIAGNOSIS — B34.9 VIRAL ILLNESS: ICD-10-CM

## 2021-02-09 PROCEDURE — U0003 INFECTIOUS AGENT DETECTION BY NUCLEIC ACID (DNA OR RNA); SEVERE ACUTE RESPIRATORY SYNDROME CORONAVIRUS 2 (SARS-COV-2) (CORONAVIRUS DISEASE [COVID-19]), AMPLIFIED PROBE TECHNIQUE, MAKING USE OF HIGH THROUGHPUT TECHNOLOGIES AS DESCRIBED BY CMS-2020-01-R: HCPCS | Performed by: NURSE PRACTITIONER

## 2021-02-09 PROCEDURE — U0005 INFEC AGEN DETEC AMPLI PROBE: HCPCS | Performed by: NURSE PRACTITIONER

## 2021-02-10 LAB — SARS-COV-2 RNA RESP QL NAA+PROBE: NEGATIVE

## 2021-03-02 ENCOUNTER — OFFICE VISIT (OUTPATIENT)
Dept: SLEEP CENTER | Facility: CLINIC | Age: 36
End: 2021-03-02
Payer: COMMERCIAL

## 2021-03-02 VITALS
DIASTOLIC BLOOD PRESSURE: 74 MMHG | SYSTOLIC BLOOD PRESSURE: 110 MMHG | HEIGHT: 74 IN | WEIGHT: 315 LBS | BODY MASS INDEX: 40.43 KG/M2

## 2021-03-02 DIAGNOSIS — E66.01 CLASS 3 SEVERE OBESITY DUE TO EXCESS CALORIES WITHOUT SERIOUS COMORBIDITY WITH BODY MASS INDEX (BMI) OF 40.0 TO 44.9 IN ADULT (HCC): ICD-10-CM

## 2021-03-02 DIAGNOSIS — G47.33 OBSTRUCTIVE SLEEP APNEA: Primary | ICD-10-CM

## 2021-03-02 PROCEDURE — 3008F BODY MASS INDEX DOCD: CPT | Performed by: PSYCHIATRY & NEUROLOGY

## 2021-03-02 PROCEDURE — 1036F TOBACCO NON-USER: CPT | Performed by: PSYCHIATRY & NEUROLOGY

## 2021-03-02 PROCEDURE — 99214 OFFICE O/P EST MOD 30 MIN: CPT | Performed by: PSYCHIATRY & NEUROLOGY

## 2021-03-02 NOTE — PATIENT INSTRUCTIONS
1  Obstructive sleep apnea   2  Obesity      Nursing Support:  When: Monday through Friday 7A-5PM except holidays  Where: Our direct line is [phone]  If you are having a true emergency please call 911  In the event that the line is busy or it is after hours please leave a voice message and we will return your call  Please speak clearly, leaving your full name, birth date, best number to reach you and the reason for your call  Medication refills: We will need the name of the medication, the dosage, the ordering provider, whether you get a 30 or 90 day refill, and the pharmacy name and address  Medications will be ordered by the provider only  Nurses cannot call in prescriptions  Please allow 7 days for medication refills  Physician requested updates: If your provider requested that you call with an update after starting medication, please be ready to provide us the medication and dosage, what time you take your medication, the time you attempt to fall asleep, time you fall asleep, when you wake up, and what time you get out of bed  Sleep Study Results: We will contact you with sleep study results and/or next steps after the physician has reviewed your testing

## 2021-03-02 NOTE — PROGRESS NOTES
Progress Note - [address] 37 y o  male   QFC:[id number], MRN: [id number]  3/2/2021          Follow Up Evaluation / Problem:     Obstructive Sleep Apnea  Obesity      Thank you for the opportunity of participating in the evaluation and care of this patient in the Sleep Clinic at [facility]  HPI: Rojas Waggoner is a 37y o  year old male  He was previously diagnosed with obstructive sleep apnea on home sleep test   At that time he was given a prescription for auto PAP which was set up for him  He received a mask along with his equipment but was never fitted  He is currently using a full face mask and complains of some air leaks  Current Outpatient Medications:     zolpidem (AMBIEN) 10 mg tablet, , Disp: , Rfl:     Vyvanse 20 MG capsule, Take 1 capsule (20 mg total) by mouth 2 (two) times a dayMax Daily Amount: 40 mg, Disp: 60 capsule, Rfl: 0    [place] Sleepiness Scale  Sitting and reading: Slight chance of dozing  Watching TV: Slight chance of dozing  Sitting, inactive in a public place (e g  a theatre or a meeting): Would never doze  As a passenger in a car for an hour without a break: Would never doze  Lying down to rest in the afternoon when circumstances permit: Moderate chance of dozing  Sitting and talking to someone: Would never doze  Sitting quietly after a lunch without alcohol: Would never doze  In a car, while stopped for a few minutes in traffic: Slight chance of dozing  Total score: 5              Vitals:    03/02/21 0753   BP: 110/74   Weight: (!) 149 kg (327 lb 6 4 oz)   Height: 6' 2" (1 88 m)       Body mass index is 42 04 kg/m²  EPWORTH SLEEPINESS SCORE  Total score: 5      Past History Since Last Sleep Center Visit:       Since his last visit he has been using his equipment nightly  He occasionally does wear his equipment for 4 hours but is generally compliant    He is cleaning his equipment as previously instructed  He feels comfortable with treatment and is more awake and alert than in the past   Today's [place] score is only 5  The review of systems and following portions of the patient's history were reviewed and updated as appropriate: allergies, current medications, past family history, past medical history, past social history, past surgical history, and problem list     Review of Systems      Genitourinary none   Cardiology none   Gastrointestinal frequent heartburn/acid reflux   Neurology none   Constitutional excessive sweating at night   Integumentary none   Psychiatry none   Musculoskeletal none   Pulmonary none   ENT none   Endocrine none   Hematological none       OBJECTIVE    PAP Pressure: Nasal AutoPAP using a lower limit of 11 5 cm and an upper limit of 20 cm of water pressure  Type of mask used: full face  DME Provider: [employer]        Physical Exam:     General Appearance:   Alert, cooperative, no distress, appears stated age, obese     Head:   Normocephalic, without obvious abnormality, atraumatic     Eyes:   PERRL, conjunctiva/corneas clear, EOM's intact          Nose:  Nares normal, septum midline, no drainage or sinus tenderness     Extremities:  Extremities normal, atraumatic, no cyanosis or edema     Pulses:  2+ and symmetric all extremities     Skin:  Skin color, texture, turgor normal, no rashes or lesions     Neurologic:    CNII-XII intact  Normal strength, sensation throughout       ASSESSMENT / PLAN    1  Obstructive sleep apnea     2  Class 3 severe obesity due to excess calories without serious comorbidity with body mass index (BMI) of 40 0 to 44 9 in adult [facility])           Counseling / Coordination of Care  Total clinic time spent today 25 minutes  Greater than 50% of total time was spent with the patient and / or family counseling and / or coordination of care           A description of the counseling / coordination of care: Impressions, Diagnostic results, Prognosis, Instructions for management, Risks and benefits of treatment, Patient and family education, Risk factor reductions and Importance of compliance with treatment    The following instructions have been given to the patient today:    Patient Instructions   1  Obstructive sleep apnea   2  Obesity      Nursing Support:  When: Monday through Friday 7A-5PM except holidays  Where: Our direct line is [phone]  If you are having a true emergency please call 911  In the event that the line is busy or it is after hours please leave a voice message and we will return your call  Please speak clearly, leaving your full name, birth date, best number to reach you and the reason for your call  Medication refills: We will need the name of the medication, the dosage, the ordering provider, whether you get a 30 or 90 day refill, and the pharmacy name and address  Medications will be ordered by the provider only  Nurses cannot call in prescriptions  Please allow 7 days for medication refills  Physician requested updates: If your provider requested that you call with an update after starting medication, please be ready to provide us the medication and dosage, what time you take your medication, the time you attempt to fall asleep, time you fall asleep, when you wake up, and what time you get out of bed  Sleep Study Results: We will contact you with sleep study results and/or next steps after the physician has reviewed your testing            Roland Medina

## 2021-03-02 NOTE — PROGRESS NOTES
Review of Systems      Genitourinary none   Cardiology none   Gastrointestinal frequent heartburn/acid reflux   Neurology none   Constitutional excessive sweating at night   Integumentary none   Psychiatry none   Musculoskeletal none   Pulmonary none   ENT none   Endocrine none   Hematological none

## 2021-03-10 DIAGNOSIS — Z23 ENCOUNTER FOR IMMUNIZATION: ICD-10-CM

## 2021-03-13 ENCOUNTER — IMMUNIZATIONS (OUTPATIENT)
Dept: FAMILY MEDICINE CLINIC | Facility: HOSPITAL | Age: 36
End: 2021-03-13

## 2021-03-13 DIAGNOSIS — Z23 ENCOUNTER FOR IMMUNIZATION: Primary | ICD-10-CM

## 2021-03-13 PROCEDURE — 0001A SARS-COV-2 / COVID-19 MRNA VACCINE (PFIZER-BIONTECH) 30 MCG: CPT

## 2021-03-13 PROCEDURE — 91300 SARS-COV-2 / COVID-19 MRNA VACCINE (PFIZER-BIONTECH) 30 MCG: CPT

## 2021-04-03 ENCOUNTER — IMMUNIZATIONS (OUTPATIENT)
Dept: FAMILY MEDICINE CLINIC | Facility: HOSPITAL | Age: 36
End: 2021-04-03

## 2021-04-03 DIAGNOSIS — Z23 ENCOUNTER FOR IMMUNIZATION: Primary | ICD-10-CM

## 2021-04-03 PROCEDURE — 0002A SARS-COV-2 / COVID-19 MRNA VACCINE (PFIZER-BIONTECH) 30 MCG: CPT

## 2021-04-03 PROCEDURE — 91300 SARS-COV-2 / COVID-19 MRNA VACCINE (PFIZER-BIONTECH) 30 MCG: CPT

## 2021-04-21 ENCOUNTER — EVALUATION (OUTPATIENT)
Dept: PHYSICAL THERAPY | Facility: CLINIC | Age: 36
End: 2021-04-21
Payer: COMMERCIAL

## 2021-04-21 ENCOUNTER — APPOINTMENT (OUTPATIENT)
Dept: RADIOLOGY | Facility: CLINIC | Age: 36
End: 2021-04-21
Payer: COMMERCIAL

## 2021-04-21 ENCOUNTER — OFFICE VISIT (OUTPATIENT)
Dept: FAMILY MEDICINE CLINIC | Facility: CLINIC | Age: 36
End: 2021-04-21
Payer: COMMERCIAL

## 2021-04-21 VITALS
BODY MASS INDEX: 40.43 KG/M2 | TEMPERATURE: 98 F | RESPIRATION RATE: 19 BRPM | WEIGHT: 315 LBS | DIASTOLIC BLOOD PRESSURE: 74 MMHG | HEIGHT: 74 IN | HEART RATE: 66 BPM | SYSTOLIC BLOOD PRESSURE: 126 MMHG | OXYGEN SATURATION: 96 %

## 2021-04-21 DIAGNOSIS — M25.561 ACUTE PAIN OF RIGHT KNEE: ICD-10-CM

## 2021-04-21 DIAGNOSIS — N48.9 PENILE LESION: ICD-10-CM

## 2021-04-21 DIAGNOSIS — M25.561 ACUTE PAIN OF RIGHT KNEE: Primary | ICD-10-CM

## 2021-04-21 PROCEDURE — 1036F TOBACCO NON-USER: CPT | Performed by: FAMILY MEDICINE

## 2021-04-21 PROCEDURE — 73564 X-RAY EXAM KNEE 4 OR MORE: CPT

## 2021-04-21 PROCEDURE — 97110 THERAPEUTIC EXERCISES: CPT | Performed by: PHYSICAL THERAPIST

## 2021-04-21 PROCEDURE — 99214 OFFICE O/P EST MOD 30 MIN: CPT | Performed by: FAMILY MEDICINE

## 2021-04-21 PROCEDURE — 3725F SCREEN DEPRESSION PERFORMED: CPT | Performed by: FAMILY MEDICINE

## 2021-04-21 PROCEDURE — 3008F BODY MASS INDEX DOCD: CPT | Performed by: FAMILY MEDICINE

## 2021-04-21 PROCEDURE — 97162 PT EVAL MOD COMPLEX 30 MIN: CPT | Performed by: PHYSICAL THERAPIST

## 2021-04-21 RX ORDER — LORAZEPAM 0.5 MG/1
TABLET ORAL
COMMUNITY
Start: 2021-04-19 | End: 2022-01-13

## 2021-04-21 NOTE — PROGRESS NOTES
Assessment/Plan:   1  Acute pain of right knee    Reviewed patient's symptoms today  At this time, symptoms appear unclear  Will check x-ray to rule out gross abnormalities  At this time, start treatment with elevation of his legs at nighttime  He may use ice for 10-15 minutes multiple times a day  May also take anti inflammatory medications such as ibuprofen q 6 hours for symptom relief  Will refer patient to Physical therapy for further evaluation treatment as well  - XR knee 4+ vw right injury; Future  - Ambulatory referral to Physical Therapy; Future    2  Penile lesion    Unclear as to exact cause of patient's penile lesions  At this time, he has tried his previous topical prescriptions however these have not been helpful for him  At this time, he was also seen by Urology and   It was unclear at that time as to the exact cause of the skin lesion  At this time, will refer patient to Dermatology  - Ambulatory referral to Dermatology; Future    BMI Counseling: Body mass index is 41 98 kg/m²  The BMI is above normal  Nutrition recommendations include decreasing portion sizes, encouraging healthy choices of fruits and vegetables, decreasing fast food intake, consuming healthier snacks and limiting drinks that contain sugar  Exercise recommendations include moderate physical activity 150 minutes/week and exercising 3-5 times per week  No pharmacotherapy was ordered  Patient referred to PCP due to patient being overweight  Depression Screening and Follow-up Plan: Patient's depression screening was positive with a PHQ-2 score of 0  Clincally patient does not have depression  No treatment is required  Diagnoses and all orders for this visit:    Acute pain of left knee          Subjective:       Chief Complaint   Patient presents with    Knee Pain     R knee pain x2 weeks       Patient ID: Maurice Rodriguez is a 37 y o  male  Knee Pain   Incident onset: 2 wks ago   The incident occurred at home  There was no injury mechanism  The pain is present in the right knee  The quality of the pain is described as aching  The pain is mild  The pain has been intermittent since onset  Pertinent negatives include no numbness or tingling  Associated symptoms comments: occasional giving out  He reports no foreign bodies present  The symptoms are aggravated by movement  He has tried ice and elevation for the symptoms  The treatment provided mild relief  Review of Systems   Constitutional: Negative for activity change, chills, fatigue and fever  HENT: Negative for congestion, ear pain, sinus pressure and sore throat  Eyes: Negative for redness, itching and visual disturbance  Respiratory: Negative for cough and shortness of breath  Cardiovascular: Negative for chest pain and palpitations  Gastrointestinal: Negative for abdominal pain, diarrhea and nausea  Endocrine: Negative for cold intolerance and heat intolerance  Genitourinary: Negative for dysuria, flank pain and frequency  Musculoskeletal: Negative for arthralgias, back pain, gait problem and myalgias  Skin: Negative for color change  Allergic/Immunologic: Negative for environmental allergies  Neurological: Negative for dizziness, tingling, numbness and headaches  Psychiatric/Behavioral: Negative for behavioral problems and sleep disturbance  The following portions of the patient's history were reviewed and updated as appropriate : past family history, past medical history, past social history and past surgical history  Current Outpatient Medications:      Vyvanse 20 MG capsule, Take 1 capsule (20 mg total) by mouth 2 (two) times a dayMax Daily Amount: 40 mg, Disp: 60 capsule, Rfl: 0    zolpidem (AMBIEN) 10 mg tablet, , Disp: , Rfl:     LORazepam (ATIVAN) 0 5 mg tablet, , Disp: , Rfl:          Objective:         Vitals:    04/21/21 0851   BP: 126/74   BP Location: Left arm   Patient Position: Sitting   Cuff Size: Large Pulse: 66   Resp: 19   Temp: 98 °F (36 7 °C)   TempSrc: Tympanic   SpO2: 96%   Weight: (!) 148 kg (327 lb)   Height: 6' 2" (1 88 m)     Physical Exam  Vitals signs reviewed  Constitutional:       Appearance: Normal appearance  He is well-developed  HENT:      Head: Normocephalic and atraumatic  Right Ear: Hearing normal       Left Ear: Hearing normal       Nose: Nose normal  No septal deviation  Eyes:      General: Lids are normal       Pupils: Pupils are equal, round, and reactive to light  Neck:      Musculoskeletal: Normal range of motion  Thyroid: No thyroid mass or thyromegaly  Trachea: Trachea normal    Cardiovascular:      Rate and Rhythm: Normal rate  Pulmonary:      Effort: Pulmonary effort is normal  No respiratory distress  Breath sounds: No decreased breath sounds  Abdominal:      Tenderness: There is no guarding  Musculoskeletal: Normal range of motion  Right knee: He exhibits normal range of motion, no swelling, no effusion, no ecchymosis and no deformity  Tenderness found  No medial joint line, no lateral joint line, no MCL, no LCL and no patellar tendon tenderness noted  Comments:   Mild tenderness over quadriceps ligament as well as pes anserine bursa   Skin:     General: Skin is warm and dry  Neurological:      Mental Status: He is alert and oriented to person, place, and time  Cranial Nerves: No cranial nerve deficit  Sensory: No sensory deficit  Psychiatric:         Speech: Speech normal          Behavior: Behavior normal          Thought Content:  Thought content normal          Judgment: Judgment normal

## 2021-04-21 NOTE — PROGRESS NOTES
PT Evaluation     Today's date: 2021  Patient name: Jose Tolentino  : 1985  MRN: [id number]  Referring provider: Pat Parr DO  Dx:   Encounter Diagnosis     ICD-10-CM    1  Acute pain of right knee  M25 561 Ambulatory referral to Physical Therapy                  Assessment  Assessment details: Pt presents w/ sx's most indicative of patella femoral syndrome, question chondromalacia patellae as marked jt crepitus is observed w/ functional squatting and sit to stand motion  Moderate inflammation realized R peripatellar region w/ ttp along lateral border of patellar and w/ medial patellar glide  Ligamentous and meniscal testing - w/ no sig c/o jt line PN  Pt educated in HEP to address quad strength deficit and marked tightness of h/s, quad, and hip flexors  Impairments: activity intolerance, lacks appropriate home exercise program, pain with function and poor body mechanics  Understanding of Dx/Px/POC: good   Prognosis: good    Goals  ST  Reduce PN <3/10 w/ all activity within 3 wks  2  Increase R LE strength + 1 grade within 3 wks  LT  I in HEP within 6 wks  2  Optimize patellar alignment within 6 wks  3  PN free return to all activity within 6 wks    Plan  Planned modality interventions: cryotherapy and ultrasound  Planned therapy interventions: joint mobilization, manual therapy, patient education, strengthening, stretching, therapeutic exercise and home exercise program  Frequency: 1x week  Duration in visits: 6  Duration in weeks: 6  Plan of Care beginning date: 2021  Plan of Care expiration date: 2021  Treatment plan discussed with: patient        Subjective Evaluation    History of Present Illness  Mechanism of injury: Pt admits progressively worsened R knee PN w/ driving prolonged for work t/o the wk x several wks now  Pt reports crepitus sensation in patellar region w/ PN when bending, squatting, sit to stand after sitting prolonged    Pt maintains regular fitness routine of wt lifting but denies running or regular stretching regimen  Quality of life: good    Pain  Current pain rating: 3  At best pain ratin  At worst pain ratin  Quality: tight, sharp and discomfort  Relieving factors: ice  Aggravating factors: stair climbing, standing and sitting  Progression: worsening    Treatments  No previous or current treatments  Patient Goals  Patient goals for therapy: decreased edema, decreased pain, independence with ADLs/IADLs and return to sport/leisure activities          Objective     Active Range of Motion     Right Knee   Normal active range of motion    Passive Range of Motion     Right Knee   Normal passive range of motion    Mobility   Patellar Mobility:     Right Knee   Hypomobile: medial     Patellar Static Positioning   Left Knee: WFL  Right Knee: reynaldo    Strength/Myotome Testing     Left Knee   Normal strength    Right Knee   Flexion: 4+  Extension: 4    Tests     Right Knee   Positive patellar compression  Negative anterior drawer, anterior Lachman, patellar apprehension and posterior drawer  General Comments:      Knee Comments  Localized R peripatellar edema noted       H/s tightness moderate:  R SLR 78 deg  L SLR 82 deg    B hip flexor and quad tightness moderate             Precautions: Lactose allergy      Manuals 4-21            R Patellar mobs NV            Manual h/s str NV            Manual quad/hip flex str NV                         Neuro Re-Ed                                                                                                        Ther Ex 4-21            SLR w/ hip ER x10            S/l SLR hip abd x10            Clamshells BlueTB 3s x20            Hip add ball sqz 5s 20            H/s str grn strap 20s x3            Prone hip flex/quad str grn strap 20s x3                                      Ther Activity                                       Gait Training                                       Modalities

## 2021-04-26 ENCOUNTER — OFFICE VISIT (OUTPATIENT)
Dept: PHYSICAL THERAPY | Facility: CLINIC | Age: 36
End: 2021-04-26
Payer: COMMERCIAL

## 2021-04-26 DIAGNOSIS — M25.561 ACUTE PAIN OF RIGHT KNEE: Primary | ICD-10-CM

## 2021-04-26 PROCEDURE — 97140 MANUAL THERAPY 1/> REGIONS: CPT | Performed by: PHYSICAL THERAPIST

## 2021-04-26 PROCEDURE — 97110 THERAPEUTIC EXERCISES: CPT | Performed by: PHYSICAL THERAPIST

## 2021-04-26 NOTE — PROGRESS NOTES
Daily Note     Today's date: 2021  Patient name: Sara Conway  : 1985  MRN: [id number]  Referring provider: Katarzyna Busch DO  Dx:   Encounter Diagnosis     ICD-10-CM    1  Acute pain of right knee  M25 561                   Subjective: Pt reports good response to HEP routine, slightly less PN noted the past few days until he fell on his knee OTW  Objective: See treatment diary below      Assessment: Tolerated treatment well  Patient would benefit from continued PT  Good mitch to TE progressions, minimal PN, mild fatigue  Initiated IASTM R ITB w/ moderate restriction noted t/o  Plan: Continue per plan of care        Precautions: Lactose allergy      Manuals            R Patellar mobs NV jph           Manual h/s str NV jph           Manual quad/hip flex str NV            IASTM R ITB  jph           Neuro Re-Ed                                                                                                        Ther Ex            SLR w/ hip ER x10 2# 2x15           S/l SLR hip abd x10 2# 2x15           Clamshells BlueTB 3s x20 BlueTB 3s x30           Hip add ball sqz 5s 20 5s x30           H/s str grn strap 20s x3            Prone hip flex/quad str grn strap 20s x3            Quad sets w/ towel  5s x30           Rec bike  x8'                                                  Ther Activity                                       Gait Training                                       Modalities

## 2021-05-04 ENCOUNTER — APPOINTMENT (OUTPATIENT)
Dept: PHYSICAL THERAPY | Facility: CLINIC | Age: 36
End: 2021-05-04
Payer: COMMERCIAL

## 2021-05-05 ENCOUNTER — APPOINTMENT (OUTPATIENT)
Dept: PHYSICAL THERAPY | Facility: CLINIC | Age: 36
End: 2021-05-05
Payer: COMMERCIAL

## 2021-05-06 ENCOUNTER — APPOINTMENT (OUTPATIENT)
Dept: PHYSICAL THERAPY | Facility: CLINIC | Age: 36
End: 2021-05-06
Payer: COMMERCIAL

## 2021-05-10 ENCOUNTER — OFFICE VISIT (OUTPATIENT)
Dept: PHYSICAL THERAPY | Facility: CLINIC | Age: 36
End: 2021-05-10
Payer: COMMERCIAL

## 2021-05-10 DIAGNOSIS — M25.561 ACUTE PAIN OF RIGHT KNEE: Primary | ICD-10-CM

## 2021-05-10 PROCEDURE — 97112 NEUROMUSCULAR REEDUCATION: CPT | Performed by: PHYSICAL THERAPY ASSISTANT

## 2021-05-10 PROCEDURE — 97140 MANUAL THERAPY 1/> REGIONS: CPT | Performed by: PHYSICAL THERAPY ASSISTANT

## 2021-05-10 PROCEDURE — 97110 THERAPEUTIC EXERCISES: CPT | Performed by: PHYSICAL THERAPY ASSISTANT

## 2021-05-11 ENCOUNTER — APPOINTMENT (OUTPATIENT)
Dept: PHYSICAL THERAPY | Facility: CLINIC | Age: 36
End: 2021-05-11
Payer: COMMERCIAL

## 2021-05-18 ENCOUNTER — APPOINTMENT (OUTPATIENT)
Dept: PHYSICAL THERAPY | Facility: CLINIC | Age: 36
End: 2021-05-18
Payer: COMMERCIAL

## 2022-01-13 ENCOUNTER — OFFICE VISIT (OUTPATIENT)
Dept: FAMILY MEDICINE CLINIC | Facility: CLINIC | Age: 37
End: 2022-01-13
Payer: COMMERCIAL

## 2022-01-13 VITALS
BODY MASS INDEX: 39.17 KG/M2 | HEIGHT: 75 IN | TEMPERATURE: 97.6 F | SYSTOLIC BLOOD PRESSURE: 120 MMHG | DIASTOLIC BLOOD PRESSURE: 80 MMHG | WEIGHT: 315 LBS | RESPIRATION RATE: 18 BRPM | HEART RATE: 81 BPM | OXYGEN SATURATION: 97 %

## 2022-01-13 DIAGNOSIS — Z23 NEED FOR INFLUENZA VACCINATION: ICD-10-CM

## 2022-01-13 DIAGNOSIS — N46.01 AZOOSPERMIA: Primary | ICD-10-CM

## 2022-01-13 DIAGNOSIS — E66.01 MORBID OBESITY WITH BMI OF 40.0-44.9, ADULT (HCC): ICD-10-CM

## 2022-01-13 DIAGNOSIS — Z00.00 ANNUAL PHYSICAL EXAM: ICD-10-CM

## 2022-01-13 DIAGNOSIS — F90.2 ATTENTION DEFICIT HYPERACTIVITY DISORDER (ADHD), COMBINED TYPE: ICD-10-CM

## 2022-01-13 PROCEDURE — 3008F BODY MASS INDEX DOCD: CPT | Performed by: FAMILY MEDICINE

## 2022-01-13 PROCEDURE — 90686 IIV4 VACC NO PRSV 0.5 ML IM: CPT | Performed by: FAMILY MEDICINE

## 2022-01-13 PROCEDURE — 90471 IMMUNIZATION ADMIN: CPT | Performed by: FAMILY MEDICINE

## 2022-01-13 PROCEDURE — 1036F TOBACCO NON-USER: CPT | Performed by: FAMILY MEDICINE

## 2022-01-13 PROCEDURE — 99395 PREV VISIT EST AGE 18-39: CPT | Performed by: FAMILY MEDICINE

## 2022-01-13 PROCEDURE — 99214 OFFICE O/P EST MOD 30 MIN: CPT | Performed by: FAMILY MEDICINE

## 2022-01-13 PROCEDURE — 3725F SCREEN DEPRESSION PERFORMED: CPT | Performed by: FAMILY MEDICINE

## 2022-01-13 NOTE — PROGRESS NOTES
Assessment/Plan:   1  Azoospermia  Presents status post a vasectomy  At this time, will check semen analysis at his request   - Semen analysis, post-vasectomy; Future    2  Attention deficit hyperactivity disorder (ADHD), combined type  Patient's symptoms appear very well controlled  At this time, he states that his Vyvanse at 20 mg b i d  was effective for him  Will continue with his current treatment  PDMP does not show any abnormalities  Follow up with patient in 6 months  3  Need for influenza vaccination  - influenza vaccine, quadrivalent, 0 5 mL, preservative-free, for adult and pediatric patients 6 mos+ (AFLURIA, FLUARIX, FLULAVAL, FLUZONE)           Diagnoses and all orders for this visit:    Azoospermia  -     Semen analysis, post-vasectomy; Future    Attention deficit hyperactivity disorder (ADHD), combined type    Annual physical exam    Morbid obesity with BMI of 40 0-44 9, adult ([facility] [address] )    Need for influenza vaccination  -     influenza vaccine, quadrivalent, 0 5 mL, preservative-free, for adult and pediatric patients 6 mos+ (AFLURIA, FLUARIX, FLULAVAL, FLUZONE)          Subjective:       Chief Complaint   Patient presents with    Physical Exam      Patient ID: Leann Espana is a 31 y o  male presents today for follow-up on his chronic meds  He has ADHD  He previously was taking his Vyvanse however he did need to stop this medication secondary to insurance issues  He would like to restart this today  He states that he did have a vasectomy however would like to have a sperm semen analysis  HPI    Review of Systems   Constitutional: Negative for activity change, chills, fatigue and fever  HENT: Negative for congestion, ear pain, sinus pressure and sore throat  Eyes: Negative for redness, itching and visual disturbance  Respiratory: Negative for cough and shortness of breath  Cardiovascular: Negative for chest pain and palpitations     Gastrointestinal: Negative for abdominal pain, diarrhea and nausea  Endocrine: Negative for cold intolerance and heat intolerance  Genitourinary: Negative for dysuria, flank pain and frequency  Musculoskeletal: Negative for arthralgias, back pain, gait problem and myalgias  Skin: Negative for color change  Allergic/Immunologic: Negative for environmental allergies  Neurological: Negative for dizziness, numbness and headaches  Psychiatric/Behavioral: Negative for behavioral problems and sleep disturbance  The following portions of the patient's history were reviewed and updated as appropriate : past family history, past medical history, past social history and past surgical history  Current Outpatient Medications:     zolpidem (AMBIEN) 10 mg tablet, , Disp: , Rfl:     Vyvanse 20 MG capsule, Take 1 capsule (20 mg total) by mouth 2 (two) times a dayMax Daily Amount: 40 mg, Disp: 60 capsule, Rfl: 0         Objective:         Vitals:    01/13/22 1034   BP: 120/80   BP Location: Left arm   Patient Position: Sitting   Cuff Size: Large   Pulse: 81   Resp: 18   Temp: 97 6 °F (36 4 °C)   TempSrc: Tympanic   SpO2: 97%   Weight: (!) 153 kg (338 lb)   Height: 6' 3" (1 905 m)     Physical Exam  Vitals reviewed  Constitutional:       Appearance: He is well-developed  HENT:      Head: Normocephalic and atraumatic  Nose: Nose normal       Mouth/Throat:      Pharynx: No oropharyngeal exudate  Eyes:      General: No scleral icterus  Right eye: No discharge  Left eye: No discharge  Pupils: Pupils are equal, round, and reactive to light  Neck:      Trachea: No tracheal deviation  Cardiovascular:      Rate and Rhythm: Normal rate and regular rhythm  Pulses:           Dorsalis pedis pulses are 2+ on the right side and 2+ on the left side  Posterior tibial pulses are 2+ on the right side and 2+ on the left side  Heart sounds: Normal heart sounds  No murmur heard  No friction rub  No gallop  Pulmonary:      Effort: Pulmonary effort is normal  No respiratory distress  Breath sounds: Normal breath sounds  No wheezing or rales  Abdominal:      General: Bowel sounds are normal  There is no distension  Palpations: Abdomen is soft  Tenderness: There is no abdominal tenderness  There is no guarding or rebound  Musculoskeletal:         General: Normal range of motion  Cervical back: Normal range of motion and neck supple  Lymphadenopathy:      Head:      Right side of head: No submental or submandibular adenopathy  Left side of head: No submental or submandibular adenopathy  Cervical: No cervical adenopathy  Right cervical: No superficial, deep or posterior cervical adenopathy  Left cervical: No superficial, deep or posterior cervical adenopathy  Skin:     General: Skin is warm and dry  Findings: No erythema  Neurological:      Mental Status: He is alert and oriented to person, place, and time  Cranial Nerves: No cranial nerve deficit  Sensory: No sensory deficit  Psychiatric:         Mood and Affect: Mood is not anxious or depressed  Speech: Speech normal          Behavior: Behavior normal          Thought Content:  Thought content normal          Judgment: Judgment normal

## 2022-01-13 NOTE — PATIENT INSTRUCTIONS

## 2022-01-13 NOTE — PROGRESS NOTES
ADULT ANNUAL [address]  GROUP    NAME: Leigh Monk  AGE: 31 y o  SEX: male  : 1985     DATE: 2022     Assessment and Plan:     Problem List Items Addressed This Visit     None      Visit Diagnoses     Azoospermia    -  Primary    Annual physical exam        Morbid obesity with BMI of 40 0-44 9, adult ([facility] [address] )              Immunizations and preventive care screenings were discussed with patient today  Appropriate education was printed on patient's after visit summary  Counseling:  Alcohol/drug use: discussed moderation in alcohol intake, the recommendations for healthy alcohol use, and avoidance of illicit drug use  Dental Health: discussed importance of regular tooth brushing, flossing, and dental visits  Injury prevention: discussed safety/seat belts, safety helmets, smoke detectors, carbon dioxide detectors, and smoking near bedding or upholstery  Sexual health: discussed sexually transmitted diseases, partner selection, use of condoms, avoidance of unintended pregnancy, and contraceptive alternatives  · Exercise: the importance of regular exercise/physical activity was discussed  Recommend exercise 3-5 times per week for at least 30 minutes  BMI Counseling: Body mass index is 42 25 kg/m²  The BMI is above normal  Nutrition recommendations include decreasing portion sizes, encouraging healthy choices of fruits and vegetables, decreasing fast food intake, consuming healthier snacks and limiting drinks that contain sugar  Exercise recommendations include moderate physical activity 150 minutes/week and exercising 3-5 times per week  No pharmacotherapy was ordered  Patient referred to PCP  Rationale for BMI follow-up plan is due to patient being overweight or obese  Depression Screening and Follow-up Plan: Patient was screened for depression during today's encounter   They screened negative with a PHQ-2 score of 0         Return in 6 months (on 7/13/2022)  Chief Complaint:     Chief Complaint   Patient presents with    Physical Exam      History of Present Illness:     Adult Annual Physical   Patient here for a comprehensive physical exam  The patient reports problems - going through divorce  Diet and Physical Activity  · Diet/Nutrition: heart healthy (low sodium) diet  · Exercise: no formal exercise  Depression Screening  PHQ-2/9 Depression Screening    Little interest or pleasure in doing things: 0 - not at all  Feeling down, depressed, or hopeless: 0 - not at all  PHQ-2 Score: 0  PHQ-2 Interpretation: Negative depression screen       General Health  · Sleep: sleeps well  · Hearing: normal - bilateral   · Vision: no vision problems  · Dental: regular dental visits  [facility]  · History of STDs?: no      Review of Systems:     Review of Systems   Constitutional: Negative for activity change, chills, fatigue and fever  HENT: Negative for congestion, ear pain, sinus pressure and sore throat  Eyes: Negative for redness, itching and visual disturbance  Respiratory: Negative for cough and shortness of breath  Cardiovascular: Negative for chest pain and palpitations  Gastrointestinal: Negative for abdominal pain, diarrhea and nausea  Endocrine: Negative for cold intolerance and heat intolerance  Genitourinary: Negative for dysuria, flank pain and frequency  Musculoskeletal: Negative for arthralgias, back pain, gait problem and myalgias  Skin: Negative for color change  Allergic/Immunologic: Negative for environmental allergies  Neurological: Negative for dizziness, numbness and headaches  Psychiatric/Behavioral: Negative for behavioral problems and sleep disturbance        Past Medical History:     Past Medical History:   Diagnosis Date    Anxiety     Epididymitis     Hypertension     Obesity     Orchalgia     Renal failure       Past Surgical History:     Past Surgical History:   Procedure Laterality Date    VASECTOMY  11/14/2017      Social History:     Social History     Socioeconomic History    Marital status:      Spouse name: None    Number of children: None    Years of education: None    Highest education level: None   Occupational History    None   Tobacco Use    Smoking status: Never Smoker    Smokeless tobacco: Never Used   Substance and Sexual Activity    Alcohol use: Yes     Alcohol/week: 0 0 standard drinks    Drug use: Yes     Types: Marijuana    Sexual activity: Yes     Partners: Female     Birth control/protection: Male Sterilization   Other Topics Concern    None   Social History Narrative    None     Social Determinants of Health     Financial Resource Strain: Not on file   Food Insecurity: Not on file   Transportation Needs: Not on file   Physical Activity: Not on file   Stress: Not on file   Social Connections: Not on file   Intimate Partner Violence: Not on file   Housing Stability: Not on file      Family History:     History reviewed  No pertinent family history  Current Medications:     Current Outpatient Medications   Medication Sig Dispense Refill    zolpidem (AMBIEN) 10 mg tablet       Vyvanse 20 MG capsule Take 1 capsule (20 mg total) by mouth 2 (two) times a dayMax Daily Amount: 40 mg 60 capsule 0     No current facility-administered medications for this visit  Allergies: Allergies   Allergen Reactions    Lactose - Food Allergy       Physical Exam:     /80 (BP Location: Left arm, Patient Position: Sitting, Cuff Size: Large)   Pulse 81   Temp 97 6 °F (36 4 °C) (Tympanic)   Resp 18   Ht 6' 3" (1 905 m)   Wt (!) 153 kg (338 lb)   SpO2 97%   BMI 42 25 kg/m²     Physical Exam  Vitals reviewed  Constitutional:       General: He is not in acute distress  Appearance: He is well-developed  He is not diaphoretic  HENT:      Head: Normocephalic and atraumatic   No right periorbital erythema or left periorbital erythema  Right Ear: Tympanic membrane normal  No decreased hearing noted  Left Ear: Tympanic membrane normal  No decreased hearing noted  Nose: Nose normal       Right Sinus: No maxillary sinus tenderness or frontal sinus tenderness  Left Sinus: No maxillary sinus tenderness or frontal sinus tenderness  Mouth/Throat:      Lips: Pink  Mouth: Mucous membranes are moist       Pharynx: No oropharyngeal exudate  Tonsils: No tonsillar exudate or tonsillar abscesses  Eyes:      General: Lids are normal       Extraocular Movements: Extraocular movements intact  Conjunctiva/sclera: Conjunctivae normal       Pupils: Pupils are equal, round, and reactive to light  Neck:      Thyroid: No thyroid mass  Trachea: Trachea normal    Cardiovascular:      Rate and Rhythm: Normal rate and regular rhythm  Pulses: Normal pulses  Heart sounds: Normal heart sounds  No murmur heard  No friction rub  No gallop  Pulmonary:      Effort: Pulmonary effort is normal  No respiratory distress  Breath sounds: Normal breath sounds  No wheezing or rales  Abdominal:      General: Bowel sounds are normal  There is no distension  Palpations: Abdomen is soft  There is no mass  Tenderness: There is no abdominal tenderness  There is no guarding  Musculoskeletal:         General: Normal range of motion  Cervical back: Normal range of motion and neck supple  Skin:     General: Skin is warm and dry  Coloration: Skin is not pale  Findings: No erythema or rash  Neurological:      Mental Status: He is alert and oriented to person, place, and time  Cranial Nerves: No cranial nerve deficit  Coordination: Coordination normal    Psychiatric:         Attention and Perception: Attention and perception normal          Mood and Affect: Mood and affect normal          Speech: Speech normal          Behavior: Behavior normal          Thought Content:  Thought content normal          Cognition and Memory: Cognition and memory normal          Judgment: Judgment normal           Ihab Moctezuma DO   [facility]

## 2022-02-15 ENCOUNTER — TELEPHONE (OUTPATIENT)
Dept: FAMILY MEDICINE CLINIC | Facility: CLINIC | Age: 37
End: 2022-02-15

## 2022-03-03 ENCOUNTER — OFFICE VISIT (OUTPATIENT)
Dept: SLEEP CENTER | Facility: CLINIC | Age: 37
End: 2022-03-03
Payer: COMMERCIAL

## 2022-03-03 VITALS
WEIGHT: 315 LBS | HEART RATE: 78 BPM | DIASTOLIC BLOOD PRESSURE: 91 MMHG | BODY MASS INDEX: 39.17 KG/M2 | HEIGHT: 75 IN | SYSTOLIC BLOOD PRESSURE: 139 MMHG

## 2022-03-03 DIAGNOSIS — G47.33 OBSTRUCTIVE SLEEP APNEA TREATED WITH CONTINUOUS POSITIVE AIRWAY PRESSURE (CPAP): Primary | ICD-10-CM

## 2022-03-03 DIAGNOSIS — E66.01 SEVERE OBESITY (BMI >= 40) (HCC): ICD-10-CM

## 2022-03-03 DIAGNOSIS — Z99.89 OBSTRUCTIVE SLEEP APNEA TREATED WITH CONTINUOUS POSITIVE AIRWAY PRESSURE (CPAP): Primary | ICD-10-CM

## 2022-03-03 PROCEDURE — 99214 OFFICE O/P EST MOD 30 MIN: CPT | Performed by: NURSE PRACTITIONER

## 2022-03-03 PROCEDURE — 1036F TOBACCO NON-USER: CPT | Performed by: NURSE PRACTITIONER

## 2022-03-03 PROCEDURE — 3008F BODY MASS INDEX DOCD: CPT | Performed by: NURSE PRACTITIONER

## 2022-03-03 NOTE — PROGRESS NOTES
Progress Note - [address] 31 y o  male   WUP:[birth year], MRN: [id number]  3/3/2022          Follow Up Evaluation / Problem:  Severe Obstructive Sleep Apnea  Obesity      Thank you for the opportunity of participating in the evaluation and care of this patient in the Sleep Clinic at [facility]  HPI: Reyna Renner is a 31y o  year old male  The patient presents for follow up of severe obstructive sleep apnea  He is a prior patient of Dr Jan Knowles  He completed a home sleep study in October 2020, which confirmed severe KENDRA with an PATSY of 46 5 and oxygen umesh of 83%  He began the use of APAP and presents to review annual compliance and effectiveness of treatment  Review of Systems      Genitourinary none   Cardiology none   Gastrointestinal none   Neurology none   Constitutional none   Integumentary none   Psychiatry none   Musculoskeletal none   Pulmonary none   ENT none   Endocrine none   Hematological none         Current Outpatient Medications:   Vyvanse 20 MG capsule, Take 1 capsule (20 mg total) by mouth 2 (two) times a dayMax Daily Amount: 40 mg, Disp: 60 capsule, Rfl: 0    zolpidem (AMBIEN) 10 mg tablet, , Disp: , Rfl:     [place] Sleepiness Scale  Sitting and reading: Would never doze  Watching TV: Would never doze  Sitting, inactive in a public place (e g  a theatre or a meeting):  Would never doze  As a passenger in a car for an hour without a break: Would never doze  Lying down to rest in the afternoon when circumstances permit: Would never doze  Sitting and talking to someone: Would never doze  Sitting quietly after a lunch without alcohol: Would never doze  In a car, while stopped for a few minutes in traffic: Would never doze  Total score: 0              Vitals:    03/03/22 0821   BP: 139/91   BP Location: Left arm   Patient Position: Sitting   Cuff Size: Large   Pulse: 78   Weight: (!) 150 kg (330 lb)   Height: 6' 3" (1 905 m)       Body mass index is 41 25 kg/m²  EPWORTH SLEEPINESS SCORE  Total score: 0      Past History Since Last Sleep Center Visit:   He reports that he uses his CPAP equipment every night  He can't sleep if not using it  He travels frequently and takes it with him  He feels the current setting is comfortable, but does notice some air leaks at times which can wake him  He feels the current mask is comfortable  The patient's CPAP equipment has been recalled  The patient has never used an ozone  to clean the equipment  The patient has not had any symptoms consistent with those associated with the recall  The patient reports that he cleans the equipment appropriately and changes supplies on a regular basis  He states that he does not get any replacement non-disposable filter when he gets his supplies  The review of systems and following portions of the patient's history were reviewed and updated as appropriate: allergies, current medications, past family history, past medical history, past social history, past surgical history, and problem list         OBJECTIVE  Equipment set up date:  4 29 2020  PAP Pressure: Nasal AutoPAP using a lower limit of 10 cm and an upper limit of 13 cm of water pressure  Type of mask used: full face  DME Provider: [employer]    Physical Exam:     General Appearance:   Alert, cooperative, no distress, appears stated age, obese     Head:   Normocephalic, without obvious abnormality, atraumatic     Eyes:   PERRL, conjunctiva/corneas clear, EOM's intact          Nose:  Nares normal, septum slightly deviated, no drainage or sinus tenderness           Throat:  Lips, teeth and gums normal; tongue normal size and shape and midline mucosa moist and redundant bilaterally with small oropharyngeal opening, uvula thick, tonsils normal, Mallampati class 4       Neck:  Supple, symmetrical, trachea midline, no adenopathy;  Thyroid: No enlargement, tenderness or nodules; no carotid bruit or JVD     Lungs:      Clear to auscultation bilaterally, respirations unlabored     Heart:    Bradycardic rate and regular rhythm, S1 and S2 normal, no murmur, rub or gallop       Extremities:  Extremities normal, atraumatic, no cyanosis or edema       Skin:  Skin color, texture, turgor normal, no rashes or lesions       Neurologic:  No focal deficits noted       ASSESSMENT / PLAN    1  Obstructive sleep apnea treated with continuous positive airway pressure (CPAP)  PAP DME Resupply/Reorder    PAP DME Pressure Change   2  Severe obesity (BMI >= 40) ([facility])             Counseling / Coordination of Care  Total clinic time spent today 28 minutes  Greater than 50% of total time was spent with the patient and / or family counseling and / or coordination of care  A description of the counseling / coordination of care:     Impressions, Diagnostic results, Prognosis, Instructions for management, Risks and benefits of treatment, Patient and family education, Risk factor reductions and Importance of compliance with treatment    Today I reviewed the patient's compliance data  he has been able to use the equipment 100% of all days recorded  Average usage was 4 or more hours 95 6% of all days recorded  The estimated AHI is 2 1 abnormal breathing events per hour  Upper pressure limit was decreased to improve air leaks and some mild aerophagia he reports intermittently  The patient feels they benefit from the use of PAP equipment and would like to continue PAP therapy  Response to treatment has been good  A prescription for supplies has been provided for the next year  We discussed the recent recall of the patient's PAP equipment  The risks and benefits for continued use vs  Discontinuation of PAP therapy were discussed  It is ultimately the patient's decision whether or not to continue PAP therapy at this time    The patient was advised to register their equipment on the [employer], which will give them further direction in the future replacement of the equipment  Since the ozone cleaning devices have been suspected as a causative agent in the recall, the patient has been advised to avoid using any ozone cleaning device and clean the equipment using mild soap and water  He will continue using this equipment at the settings noted above for the next 12 months  At that timehe will then return for a routine follow-up evaluation  I have asked the patient to contact the Sleep [facility] if he encounters any difficulties prior to that time  The following instructions have been given to the patient today:    Patient Instructions   1  Continue use of CPAP equipment nightly, at your discretion  2  Continue to clean your equipment, as discussed  3  Contact the Sleep [address] [facility] with any questions or concerns prior to your next visit, as needed  4  Schedule visit for follow-up in 1 year    Continuous Positive Airway Pressure (CPAP) therapy was prescribed to you as a medical necessity and there are risks of discontinuing  use of the device, some of which may be long term  Symptoms you experienced before using CPAP may return such as snoring, apneas, excessive daytime sleepiness, hypertension, cardiac arrhythmias, risk of stroke, congestive heart-failure, exacerbation of COPD and potential respiratory failure  Ultimately, it is a personal decision for you to make if you continue use of an affected device or discontinue until a replacement is provided  Unfortunately, [employer] has not yet provided us with information about available devices  Registering your device will allow you to receive up to date information regarding the recall  You can visit their website at [email or web address] to register your device and to learn more about how [employer] plans to replace your device    OR  You can call them to register your device and ask any questions at:  [phone]    It is advised that you do not use any type of ozone  for your PAP equipment  Nursing Support:  When: Monday through Friday 7A-5PM except holidays  Where: Our direct line is [phone]  If you are having a true emergency please call 911  In the event that the line is busy or it is after hours please leave a voice message and we will return your call  Please speak clearly, leaving your full name, birth date, best number to reach you and the reason for your call  Medication refills: We will need the name of the medication, the dosage, the ordering provider, whether you get a 30 or 90 day refill, and the pharmacy name and address  Medications will be ordered by the provider only  Nurses cannot call in prescriptions  Please allow 7 days for medication refills  Physician requested updates: If your provider requested that you call with an update after starting medication, please be ready to provide us the medication and dosage, what time you take your medication, the time you attempt to fall asleep, time you fall asleep, when you wake up, and what time you get out of bed  Sleep Study Results: We will contact you with sleep study results and/or next steps after the physician has reviewed your testing        Sarah Zavala, [address] [facility]

## 2022-03-03 NOTE — PATIENT INSTRUCTIONS
1   Continue use of CPAP equipment nightly, at your discretion  2  Continue to clean your equipment, as discussed  3  Contact the Sleep [address] [place] [address] with any questions or concerns prior to your next visit, as needed  4  Schedule visit for follow-up in 1 year    Continuous Positive Airway Pressure (CPAP) therapy was prescribed to you as a medical necessity and there are risks of discontinuing  use of the device, some of which may be long term  Symptoms you experienced before using CPAP may return such as snoring, apneas, excessive daytime sleepiness, hypertension, cardiac arrhythmias, risk of stroke, congestive heart-failure, exacerbation of COPD and potential respiratory failure  Ultimately, it is a personal decision for you to make if you continue use of an affected device or discontinue until a replacement is provided  Unfortunately, [employer] has not yet provided us with information about available devices  Registering your device will allow you to receive up to date information regarding the recall  You can visit their website at [email or web address] to register your device and to learn more about how [employer] plans to replace your device  OR  You can call them to register your device and ask any questions at:  [phone]    It is advised that you do not use any type of ozone  for your PAP equipment  Nursing Support:  When: Monday through Friday 7A-5PM except holidays  Where: Our direct line is [phone]  If you are having a true emergency please call 911  In the event that the line is busy or it is after hours please leave a voice message and we will return your call  Please speak clearly, leaving your full name, birth date, best number to reach you and the reason for your call  Medication refills: We will need the name of the medication, the dosage, the ordering provider, whether you get a 30 or 90 day refill, and the pharmacy name and address  Medications will be ordered by the provider only  Nurses cannot call in prescriptions  Please allow 7 days for medication refills  Physician requested updates: If your provider requested that you call with an update after starting medication, please be ready to provide us the medication and dosage, what time you take your medication, the time you attempt to fall asleep, time you fall asleep, when you wake up, and what time you get out of bed  Sleep Study Results: We will contact you with sleep study results and/or next steps after the physician has reviewed your testing

## 2022-03-04 ENCOUNTER — TELEPHONE (OUTPATIENT)
Dept: SLEEP CENTER | Facility: CLINIC | Age: 37
End: 2022-03-04

## 2022-04-27 ENCOUNTER — OFFICE VISIT (OUTPATIENT)
Dept: URGENT CARE | Facility: MEDICAL CENTER | Age: 37
End: 2022-04-27
Payer: COMMERCIAL

## 2022-04-27 VITALS
WEIGHT: 315 LBS | OXYGEN SATURATION: 98 % | HEART RATE: 76 BPM | TEMPERATURE: 97.5 F | SYSTOLIC BLOOD PRESSURE: 128 MMHG | DIASTOLIC BLOOD PRESSURE: 75 MMHG | BODY MASS INDEX: 41.25 KG/M2 | RESPIRATION RATE: 18 BRPM

## 2022-04-27 DIAGNOSIS — S93.401A SPRAIN OF RIGHT ANKLE, UNSPECIFIED LIGAMENT, INITIAL ENCOUNTER: Primary | ICD-10-CM

## 2022-04-27 PROCEDURE — 99213 OFFICE O/P EST LOW 20 MIN: CPT | Performed by: EMERGENCY MEDICINE

## 2022-04-27 NOTE — PROGRESS NOTES
[facility]        NAME: Clari Simon is a 31 y o  male  : 1985    MRN: [id number]  DATE: 2022  TIME: 3:48 PM    Assessment and Plan   Sprain of right ankle, unspecified ligament, initial encounter [S93 401A]  1  Sprain of right ankle, unspecified ligament, initial encounter       Xray negative by my read for acute fracture or dislocation  Will treat conservatively  Patient bought a compressive sleeve and orthotics for the shoes he is to wear  Recommend RICE and antiinflammatories  F/u with ortho or sports med as outpatient if pain persists or worsens           Patient Instructions     Ankle Sprain   WHAT YOU NEED TO KNOW:   An ankle sprain happens when 1 or more ligaments in your ankle joint stretch or tear  Ligaments are tough tissues that connect bones  Ligaments support your joints and keep your bones in place  DISCHARGE INSTRUCTIONS:   Return to the emergency department if:   · You have severe pain in your ankle  · Your foot or toes are cold or numb  · Your ankle becomes more weak or unstable (wobbly)  · You are unable to put any weight on your ankle or foot  · Your swelling has increased or returned  Call your doctor if:   · Your pain does not go away, even after treatment  · You have questions or concerns about your condition or care  Medicines: You may need any of the following:  · NSAIDs , such as ibuprofen, help decrease swelling, pain, and fever  This medicine is available with or without a doctor's order  NSAIDs can cause stomach bleeding or kidney problems in certain people  If you take blood thinner medicine, always ask your healthcare provider if NSAIDs are safe for you  Always read the medicine label and follow directions  · Acetaminophen  decreases pain and fever  It is available without a doctor's order  Ask how much to take and how often to take it  Follow directions   Read the labels of all other medicines you are using to see if they also contain acetaminophen, or ask your doctor or pharmacist  Acetaminophen can cause liver damage if not taken correctly  Do not use more than 4 grams (4,000 milligrams) total of acetaminophen in one day  · Prescription pain medicine  may be given  Ask your healthcare provider how to take this medicine safely  Some prescription pain medicines contain acetaminophen  Do not take other medicines that contain acetaminophen without talking to your healthcare provider  Too much acetaminophen may cause liver damage  Prescription pain medicine may cause constipation  Ask your healthcare provider how to prevent or treat constipation  · Take your medicine as directed  Contact your healthcare provider if you think your medicine is not helping or if you have side effects  Tell him or her if you are allergic to any medicine  Keep a list of the medicines, vitamins, and herbs you take  Include the amounts, and when and why you take them  Bring the list or the pill bottles to follow-up visits  Carry your medicine list with you in case of an emergency  Self-care:   · Use support devices,  such as a brace, cast, or splint, to limit your movement and protect your joint  You may need to use crutches to decrease your pain as you move around  · Go to physical therapy as directed  A physical therapist teaches you exercises to help improve movement and strength, and to decrease pain  · Rest  your ankle so that it can heal  Return to normal activities as directed  · Apply ice  on your ankle for 15 to 20 minutes every hour or as directed  Use an ice pack, or put crushed ice in a plastic bag  Cover it with a towel  Ice helps prevent tissue damage and decreases swelling and pain  · Compress  your ankle  Ask if you should wrap an elastic bandage around your injured ligament  An elastic bandage provides support and helps decrease swelling and movement so your joint can heal  Wear as long as directed           · Elevate  your ankle above the level of your heart as often as you can  This will help decrease swelling and pain  Prop your ankle on pillows or blankets to keep it elevated comfortably  Prevent another ankle sprain:   · Let your ankle heal   Find out how long your ligament needs to heal  Do not do any physical activity until your healthcare provider says it is okay  If you start activity too soon, you may develop a more serious injury  · Always warm up and stretch  before you exercise or play sports  · Use the right equipment  Always wear shoes that fit well and are made for the activity that you are doing  You may also need ankle supports, elbow and knee pads, or braces  Follow up with your doctor as directed:  Write down your questions so you remember to ask them during your visits  © Copyright [employer] 2022 Information is for End User's use only and may not be sold, redistributed or otherwise used for commercial purposes  All illustrations and images included in CareNotes® are the copyrighted property of [employer]  or [employer]   The above information is an  only  It is not intended as medical advice for individual conditions or treatments  Talk to your doctor, nurse or pharmacist before following any medical regimen to see if it is safe and effective for you  Follow up with PCP in 3-5 days  Proceed to  ER if symptoms worsen  Chief Complaint     Chief Complaint   Patient presents with    Ankle Pain     Patient c/o R ankle pain with swelling that startes last night  History of Present Illness       32 y/o male presents today with right ankle pain that started about a week ago  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 and states his ankle hasn't been quite right since  He denies a specific injury  States he can weight bear but its just 'sore' and he wanted to get it looked at          Review of Systems   Review of Systems Constitutional: Negative for chills, fatigue and fever  HENT: Negative for congestion, postnasal drip, sore throat and trouble swallowing  Eyes: Negative for visual disturbance  Respiratory: Negative for chest tightness and shortness of breath  Cardiovascular: Negative for leg swelling  Gastrointestinal: Negative for abdominal pain  Genitourinary: Negative for dysuria  Musculoskeletal: Positive for arthralgias (right ankle)  Skin: Negative for rash  Allergic/Immunologic: Negative for immunocompromised state  Neurological: Negative for dizziness, light-headedness and headaches  Psychiatric/Behavioral: Negative for confusion  Current Medications       Current Outpatient Medications:   Vyvanse 20 MG capsule, Take 1 capsule (20 mg total) by mouth 2 (two) times a dayMax Daily Amount: 40 mg, Disp: 60 capsule, Rfl: 0    zolpidem (AMBIEN) 10 mg tablet, , Disp: , Rfl:     Current Allergies     Allergies as of 04/27/2022 - Reviewed 03/03/2022   Allergen Reaction Noted    Lactose - food allergy              The following portions of the patient's history were reviewed and updated as appropriate: allergies, current medications, past family history, past medical history, past social history, past surgical history and problem list      Past Medical History:   Diagnosis Date    Anxiety     Epididymitis     Hypertension     Obesity     Orchalgia     Renal failure        Past Surgical History:   Procedure Laterality Date    VASECTOMY  11/14/2017       No family history on file  Medications have been verified  Objective   /75   Pulse 76   Temp 97 5 °F (36 4 °C)   Resp 18   Wt (!) 150 kg (330 lb)   SpO2 98%   BMI 41 25 kg/m²        Physical Exam     Physical Exam  Vitals and nursing note reviewed  Constitutional:       Appearance: Normal appearance  He is not ill-appearing  Cardiovascular:      Rate and Rhythm: Normal rate and regular rhythm        Pulses: Normal pulses  Pulmonary:      Effort: Pulmonary effort is normal       Breath sounds: Normal breath sounds  Musculoskeletal:      Right ankle: No swelling, deformity, ecchymosis or lacerations  Tenderness present over the ATF ligament and AITF ligament  No lateral malleolus, medial malleolus, CF ligament, base of 5th metatarsal or proximal fibula tenderness  Normal range of motion  Right Achilles Tendon: Normal       Left ankle: Normal       Left Achilles Tendon: Normal    Neurological:      Mental Status: He is alert

## 2022-04-27 NOTE — PATIENT INSTRUCTIONS
Ankle Sprain   WHAT YOU NEED TO KNOW:   An ankle sprain happens when 1 or more ligaments in your ankle joint stretch or tear  Ligaments are tough tissues that connect bones  Ligaments support your joints and keep your bones in place  DISCHARGE INSTRUCTIONS:   Return to the emergency department if:   · You have severe pain in your ankle  · Your foot or toes are cold or numb  · Your ankle becomes more weak or unstable (wobbly)  · You are unable to put any weight on your ankle or foot  · Your swelling has increased or returned  Call your doctor if:   · Your pain does not go away, even after treatment  · You have questions or concerns about your condition or care  Medicines: You may need any of the following:  · NSAIDs , such as ibuprofen, help decrease swelling, pain, and fever  This medicine is available with or without a doctor's order  NSAIDs can cause stomach bleeding or kidney problems in certain people  If you take blood thinner medicine, always ask your healthcare provider if NSAIDs are safe for you  Always read the medicine label and follow directions  · Acetaminophen  decreases pain and fever  It is available without a doctor's order  Ask how much to take and how often to take it  Follow directions  Read the labels of all other medicines you are using to see if they also contain acetaminophen, or ask your doctor or pharmacist  Acetaminophen can cause liver damage if not taken correctly  Do not use more than 4 grams (4,000 milligrams) total of acetaminophen in one day  · Prescription pain medicine  may be given  Ask your healthcare provider how to take this medicine safely  Some prescription pain medicines contain acetaminophen  Do not take other medicines that contain acetaminophen without talking to your healthcare provider  Too much acetaminophen may cause liver damage  Prescription pain medicine may cause constipation   Ask your healthcare provider how to prevent or treat constipation  · Take your medicine as directed  Contact your healthcare provider if you think your medicine is not helping or if you have side effects  Tell him or her if you are allergic to any medicine  Keep a list of the medicines, vitamins, and herbs you take  Include the amounts, and when and why you take them  Bring the list or the pill bottles to follow-up visits  Carry your medicine list with you in case of an emergency  Self-care:   · Use support devices,  such as a brace, cast, or splint, to limit your movement and protect your joint  You may need to use crutches to decrease your pain as you move around  · Go to physical therapy as directed  A physical therapist teaches you exercises to help improve movement and strength, and to decrease pain  · Rest  your ankle so that it can heal  Return to normal activities as directed  · Apply ice  on your ankle for 15 to 20 minutes every hour or as directed  Use an ice pack, or put crushed ice in a plastic bag  Cover it with a towel  Ice helps prevent tissue damage and decreases swelling and pain  · Compress  your ankle  Ask if you should wrap an elastic bandage around your injured ligament  An elastic bandage provides support and helps decrease swelling and movement so your joint can heal  Wear as long as directed  · Elevate  your ankle above the level of your heart as often as you can  This will help decrease swelling and pain  Prop your ankle on pillows or blankets to keep it elevated comfortably  Prevent another ankle sprain:   · Let your ankle heal   Find out how long your ligament needs to heal  Do not do any physical activity until your healthcare provider says it is okay  If you start activity too soon, you may develop a more serious injury  · Always warm up and stretch  before you exercise or play sports  · Use the right equipment  Always wear shoes that fit well and are made for the activity that you are doing   You may also need ankle supports, elbow and knee pads, or braces  Follow up with your doctor as directed:  Write down your questions so you remember to ask them during your visits  © Copyright [employer] 2022 Information is for End User's use only and may not be sold, redistributed or otherwise used for commercial purposes  All illustrations and images included in CareNotes® are the copyrighted property of [employer] M [employer] Inc  or [facility] [employer]   The above information is an  only  It is not intended as medical advice for individual conditions or treatments  Talk to your doctor, nurse or pharmacist before following any medical regimen to see if it is safe and effective for you

## 2022-05-19 ENCOUNTER — TELEPHONE (OUTPATIENT)
Dept: FAMILY MEDICINE CLINIC | Facility: CLINIC | Age: 37
End: 2022-05-19

## 2022-08-26 ENCOUNTER — APPOINTMENT (OUTPATIENT)
Dept: RADIOLOGY | Facility: MEDICAL CENTER | Age: 37
End: 2022-08-26
Payer: COMMERCIAL

## 2022-08-26 ENCOUNTER — OFFICE VISIT (OUTPATIENT)
Dept: URGENT CARE | Facility: MEDICAL CENTER | Age: 37
End: 2022-08-26
Payer: COMMERCIAL

## 2022-08-26 VITALS
DIASTOLIC BLOOD PRESSURE: 78 MMHG | TEMPERATURE: 98.1 F | SYSTOLIC BLOOD PRESSURE: 100 MMHG | OXYGEN SATURATION: 98 % | HEART RATE: 74 BPM | RESPIRATION RATE: 18 BRPM

## 2022-08-26 DIAGNOSIS — M54.42 ACUTE LEFT-SIDED LOW BACK PAIN WITH LEFT-SIDED SCIATICA: Primary | ICD-10-CM

## 2022-08-26 DIAGNOSIS — M54.50 ACUTE BILATERAL LOW BACK PAIN WITHOUT SCIATICA: ICD-10-CM

## 2022-08-26 PROCEDURE — 96372 THER/PROPH/DIAG INJ SC/IM: CPT | Performed by: PHYSICIAN ASSISTANT

## 2022-08-26 PROCEDURE — 72110 X-RAY EXAM L-2 SPINE 4/>VWS: CPT

## 2022-08-26 PROCEDURE — G0382 LEV 3 HOSP TYPE B ED VISIT: HCPCS | Performed by: PHYSICIAN ASSISTANT

## 2022-08-26 PROCEDURE — S9083 URGENT CARE CENTER GLOBAL: HCPCS | Performed by: PHYSICIAN ASSISTANT

## 2022-08-26 RX ORDER — KETOROLAC TROMETHAMINE 30 MG/ML
30 INJECTION, SOLUTION INTRAMUSCULAR; INTRAVENOUS ONCE
Status: COMPLETED | OUTPATIENT
Start: 2022-08-26 | End: 2022-08-26

## 2022-08-26 RX ORDER — METHOCARBAMOL 750 MG/1
750 TABLET, FILM COATED ORAL EVERY 6 HOURS PRN
Qty: 15 TABLET | Refills: 0 | Status: SHIPPED | OUTPATIENT
Start: 2022-08-26

## 2022-08-26 RX ORDER — PREDNISONE 50 MG/1
50 TABLET ORAL DAILY
Qty: 5 TABLET | Refills: 0 | Status: SHIPPED | OUTPATIENT
Start: 2022-08-26 | End: 2022-08-31

## 2022-08-26 RX ADMIN — KETOROLAC TROMETHAMINE 30 MG: 30 INJECTION, SOLUTION INTRAMUSCULAR; INTRAVENOUS at 16:46

## 2022-08-26 NOTE — PATIENT INSTRUCTIONS
Take prednisone as directed until completed   use Robaxin as directed   Motrin and/or Tylenol as needed for pain control   follow-up with comprehensive spine as needed  Follow up with PCP in 3-5 days  Proceed to  ER if symptoms worsen  Acute Low Back Pain   AMBULATORY CARE:   Acute low back pain  is sudden discomfort that lasts up to 6 weeks and makes activity difficult  Common symptoms include the following:   Back stiffness or spasms    Pain down the back or side of one leg    Holding yourself in an unusual position or posture to decrease your back pain    Not being able to find a sitting position that is comfortable    Slow increase in your pain for 24 to 48 hours after you stress your back    Tenderness on your lower back or severe pain when you move your back    Seek care immediately if:   You have severe pain  You have sudden stiffness and heaviness on both buttocks down to both legs  You have numbness or weakness in one leg, or pain in both legs  You have numbness in your genital area or across your lower back  You cannot control your urine or bowel movements  Call your doctor if:   You have a fever  You have pain at night or when you rest     Your pain does not get better with treatment  You have pain that worsens when you cough or sneeze  You suddenly feel something pop or snap in your back  You have questions or concerns about your condition or care  The goal of treatment for acute low back pain  is to relieve your pain and help you be able to do your regular activities  Most people with acute lower back pain get better within 4 to 6 weeks  You may need any of the following:  NSAIDs , such as ibuprofen, help decrease swelling, pain, and fever  This medicine is available with or without a doctor's order  NSAIDs can cause stomach bleeding or kidney problems in certain people  If you take blood thinner medicine, always ask your healthcare provider if NSAIDs are safe for you  Always read the medicine label and follow directions  Acetaminophen  decreases pain and fever  It is available without a doctor's order  Ask how much to take and how often to take it  Follow directions  Read the labels of all other medicines you are using to see if they also contain acetaminophen, or ask your doctor or pharmacist  Acetaminophen can cause liver damage if not taken correctly  Do not use more than 4 grams (4,000 milligrams) total of acetaminophen in one day  Muscle relaxers  decrease pain by relaxing the muscles in your lower spine  Prescription pain medicine  may be given  Ask your healthcare provider how to take this medicine safely  Some prescription pain medicines contain acetaminophen  Do not take other medicines that contain acetaminophen without talking to your healthcare provider  Too much acetaminophen may cause liver damage  Prescription pain medicine may cause constipation  Ask your healthcare provider how to prevent or treat constipation  Manage your symptoms:   Stay active  as much as you can without causing more pain  Bed rest could make your back pain worse  Start with some light exercises such as walking  Avoid heavy lifting until your pain is gone  Ask for more information about the activities or exercises that are right for you  Apply heat  on your back for 20 to 30 minutes every 2 hours for as many days as directed  Heat helps decrease pain and muscle spasms  Alternate heat and ice  Apply ice  on your back for 15 to 20 minutes every hour or as directed  Use an ice pack, or put crushed ice in a plastic bag  Cover it with a towel before you apply it to your skin  Ice helps prevent tissue damage and decreases swelling and pain  Prevent acute low back pain:   Use proper body mechanics  Bend at the hips and knees when you  objects  Do not bend from the waist  Use your leg muscles as you lift the load  Do not use your back   Keep the object close to your chest as you lift it  Try not to twist or lift anything above your waist          Change your position often when you stand for long periods of time  Rest one foot on a small box or footrest, and then switch to the other foot often  Try not to sit for long periods of time  When you do, sit in a straight-backed chair with your feet flat on the floor  Never reach, pull, or push while you are sitting  Do exercises that strengthen your back muscles  Warm up before you exercise  Ask your healthcare provider the best exercises for you  Maintain a healthy weight  Ask your healthcare provider what a healthy weight is for you  Ask him or her to help you create a weight loss plan if you are overweight  Follow up with your doctor as directed:  Return for a follow-up visit if you still have pain after 1 to 3 weeks of treatment  You may need to visit an orthopedist if your back pain lasts longer than 12 weeks  Write down your questions so you remember to ask them during your visits  © [employer] 2022 Information is for End User's use only and may not be sold, redistributed or otherwise used for commercial purposes  All illustrations and images included in CareNotes® are the copyrighted property of [employer] M [employer] Inc  or [facility] [employer]   The above information is an  only  It is not intended as medical advice for individual conditions or treatments  Talk to your doctor, nurse or pharmacist before following any medical regimen to see if it is safe and effective for you  Lower Back Exercises   AMBULATORY CARE:   Lower back exercises  help heal and strengthen your back muscles to prevent another injury  Ask your healthcare provider if you need to see a physical therapist for more advanced exercises  Seek care immediately if:   You have severe pain that prevents you from moving  Contact your healthcare provider if:   Your pain becomes worse  You have new pain      You have questions or concerns about your condition or care  Do lower back exercises safely:   Do the exercises on a mat or firm surface  (not on a bed) to support your spine and prevent low back pain  Move slowly and smoothly  Avoid fast or jerky motions  Breathe normally  Do not hold your breath  Stop if you feel pain  It is normal to feel some discomfort at first  Regular exercise will help decrease your discomfort over time  Lower back exercises: Your healthcare provider may recommend that you do back exercises 10 to 30 minutes each day  He may also recommend that you do exercises 1 to 3 times each day  Ask your healthcare provider which exercises are best for you and how often to do them  Ankle pumps:  Lie on your back  Move your foot up (with your toes pointing toward your head)  Then, move your foot down (with your toes pointing away from you)  Repeat this exercise 10 times on each side  Heel slides:  Lie on your back  Slowly bend one leg and then straighten it  Next, bend the other leg and then straighten it  Repeat 10 times on each side  Pelvic tilt:  Lie on your back with your knees bent and feet flat on the floor  Place your arms in a relaxed position beside your body  Tighten the muscles of your abdomen and flatten your back against the floor  Hold for 5 seconds  Repeat 5 times  Back stretch:  Lie on your back with your hands behind your head  Bend your knees and turn the lower half of your body to one side  Hold this position for 10 seconds  Repeat 3 times on each side  Straight leg raises:  Lie on your back with one leg straight  Bend the other knee  Tighten your abdomen and then slowly lift the straight leg up about 6 to 12 inches off the floor  Hold for 1 to 5 seconds  Lower your leg slowly  Repeat 10 times on each leg  Knee-to-chest:  Lie on your back with your knees bent and feet flat on the floor   Pull one of your knees toward your chest and hold it there for 5 seconds  Return your leg to the starting position  Lift the other knee toward your chest and hold for 5 seconds  Do this 5 times on each side  Cat and camel:  Place your hands and knees on the floor  Arch your back upward toward the ceiling and lower your head  Round out your spine as much as you can  Hold for 5 seconds  Lift your head upward and push your chest downward toward the floor  Hold for 5 seconds  Do 3 sets or as directed  Wall squats:  Stand with your back against a wall  Tighten the muscles of your abdomen  Slowly lower your body until your knees are bent at a 45 degree angle  Hold this position for 5 seconds  Slowly move back up to a standing position  Repeat 10 times  Curl up:  Lie on your back with your knees bent and feet flat on the floor  Place your hands, palms down, underneath the curve in your lower back  Next, with your elbows on the floor, lift your shoulders and chest 2 to 3 inches  Keep your head in line with your shoulders  Hold this position for 5 seconds  When you can do this exercise without pain for 10 to 15 seconds, you may add a rotation  While your shoulders and chest are lifted off the ground, turn slightly to the left and hold  Repeat on the other side  Bird dog:  Place your hands and knees on the floor  Keep your wrists directly below your shoulders and your knees directly below your hips  Pull your belly button in toward your spine  Do not flatten or arch your back  Tighten your abdominal muscles  Raise one arm straight out so that it is aligned with your head  Next, raise the leg opposite your arm  Hold this position for 15 seconds  Lower your arm and leg slowly and change sides  Do 5 sets  © Copyright [employer] 2022 Information is for End User's use only and may not be sold, redistributed or otherwise used for commercial purposes   All illustrations and images included in CareNotes® are the copyrighted property of [employer] Inc  or [facility] [employer]   The above information is an  only  It is not intended as medical advice for individual conditions or treatments  Talk to your doctor, nurse or pharmacist before following any medical regimen to see if it is safe and effective for you

## 2022-08-26 NOTE — PROGRESS NOTES
[facility]        NAME: Dandre Lainez is a 30 y o  male  : 1985    MRN: [id number]  DATE: 2022  TIME: 5:02 PM    Assessment and Plan   Acute left-sided low back pain with left-sided sciatica [M54 42]  1  Acute left-sided low back pain with left-sided sciatica  XR spine lumbar minimum 4 views non injury    ketorolac (TORADOL) injection 30 mg    methocarbamol (ROBAXIN) 750 mg tablet    predniSONE 50 mg tablet    Ambulatory Referral to Comprehensive Spine Program         Patient Instructions     Take prednisone as directed until completed   use Robaxin as directed   Motrin and/or Tylenol as needed for pain control   follow-up with comprehensive spine as needed  Follow up with PCP in 3-5 days  Proceed to  ER if symptoms worsen  Chief Complaint     Chief Complaint   Patient presents with    Back Pain     Pt presents for lower back pain radiating to left hip area  Pt states he threw his back out last week, but today has worsened while bending and now cannot lift left leg  Pain is throbbing in nature  History of Present Illness       62-year-old male presents with left-sided low back pain into his buttock  Symptoms started today  Patient reports previous week he felt like he pulled his back out and have his massage therapist work on a which made it feel better  This morning woke up had severe pain in his left lower back  Denies any bowel or bladder incontinence  No numbness or tingling into the leg  Denies any chest pain shortness of breath or cough  Back Pain  This is a new problem  The current episode started today  The problem occurs constantly  The problem is unchanged  The pain is present in the lumbar spine and gluteal  The quality of the pain is described as shooting and stabbing  The pain is severe  The pain is the same all the time  The symptoms are aggravated by bending, position and twisting  Stiffness is present in the morning   Pertinent negatives include no bladder incontinence, bowel incontinence, fever, leg pain, numbness, tingling or weakness  Risk factors include obesity  He has tried nothing for the symptoms  The treatment provided no relief  Review of Systems   Review of Systems   Constitutional: Negative  Negative for fever  HENT: Negative  Eyes: Negative  Respiratory: Negative  Cardiovascular: Negative  Gastrointestinal: Negative  Negative for bowel incontinence  Genitourinary: Negative for bladder incontinence  Musculoskeletal: Positive for back pain  Skin: Negative  Neurological: Negative  Negative for tingling, weakness and numbness  Current Medications       Current Outpatient Medications:     methocarbamol (ROBAXIN) 750 mg tablet, Take 1 tablet (750 mg total) by mouth every 6 (six) hours as needed for muscle spasms, Disp: 15 tablet, Rfl: 0    predniSONE 50 mg tablet, Take 1 tablet (50 mg total) by mouth daily for 5 days, Disp: 5 tablet, Rfl: 0    Vyvanse 20 MG capsule, Take 1 capsule (20 mg total) by mouth 2 (two) times a dayMax Daily Amount: 40 mg, Disp: 60 capsule, Rfl: 0    zolpidem (AMBIEN) 10 mg tablet, , Disp: , Rfl:   No current facility-administered medications for this visit  Current Allergies     Allergies as of 08/26/2022 - Reviewed 08/26/2022   Allergen Reaction Noted    Lactose - food allergy              The following portions of the patient's history were reviewed and updated as appropriate: allergies, current medications, past family history, past medical history, past social history, past surgical history and problem list      Past Medical History:   Diagnosis Date    Anxiety     Epididymitis     Hypertension     Obesity     Orchalgia     Renal failure        Past Surgical History:   Procedure Laterality Date    VASECTOMY  11/14/2017       History reviewed  No pertinent family history  Medications have been verified          Objective   /78   Pulse 74   Temp 98 1 °F (36 7 °C) (Tympanic)   Resp 18   SpO2 98%   No LMP for male patient  Physical Exam     Physical Exam  Vitals and nursing note reviewed  Constitutional:       General: He is not in acute distress  Appearance: Normal appearance  He is well-developed  HENT:      Head: Normocephalic and atraumatic  Right Ear: Hearing, tympanic membrane, ear canal and external ear normal  There is no impacted cerumen  Left Ear: Hearing, tympanic membrane, ear canal and external ear normal  There is no impacted cerumen  Nose: Nose normal       Mouth/Throat:      Pharynx: Uvula midline  No oropharyngeal exudate  Eyes:      General:         Right eye: No discharge  Left eye: No discharge  Conjunctiva/sclera: Conjunctivae normal    Cardiovascular:      Rate and Rhythm: Normal rate and regular rhythm  Heart sounds: Normal heart sounds  No murmur heard  Pulmonary:      Effort: Pulmonary effort is normal  No respiratory distress  Breath sounds: Normal breath sounds  No wheezing or rales  Abdominal:      General: Bowel sounds are normal       Palpations: Abdomen is soft  Tenderness: There is no abdominal tenderness  Musculoskeletal:      Cervical back: Normal range of motion and neck supple  Lumbar back: Spasms and tenderness present  No swelling, edema, deformity, signs of trauma, lacerations or bony tenderness  Decreased range of motion  No scoliosis  Back:    Lymphadenopathy:      Cervical: No cervical adenopathy  Skin:     General: Skin is warm and dry  Neurological:      Mental Status: He is alert and oriented to person, place, and time     Psychiatric:         Mood and Affect: Mood normal

## 2022-08-29 ENCOUNTER — NURSE TRIAGE (OUTPATIENT)
Dept: PHYSICAL THERAPY | Facility: OTHER | Age: 37
End: 2022-08-29

## 2022-08-29 DIAGNOSIS — M54.50 ACUTE LEFT-SIDED LOW BACK PAIN, UNSPECIFIED WHETHER SCIATICA PRESENT: Primary | ICD-10-CM

## 2022-08-29 NOTE — TELEPHONE ENCOUNTER
Additional Information   Negative: Is this related to an MVA?  Negative: Is this related to a work injury?  Negative: Are you currently recieving homecare services?  Negative: Has the patient had unexplained weight loss?  Negative: Does the patient have a fever?  Negative: Is the patient experiencing urine retention?  Negative: Is the patient experiencing blood in sputum?  Negative: Has the patient experienced major trauma? (fall from height, high speed collision, direct blow to spine) and is also experiencing nausea, light-headedness, or loss of consciousness?  Negative: Is the patient experiencing acute drop foot or paralysis?  Negative: Is this a chronic condition? Background - Initial Assessment  Clinical complaint: Left sided low back Pain that radiates to L hip and LLE  Date of onset: 8/26/22- NKI- Warranted UC visit  Frequency of pain: intermittent  Quality of pain: sharp and burning    Protocols used: [facility] COMPREHENSIVE SPINE PROGRAM PROTOCOL    Patient seen at [address] [place] [address] SEE NOTES  Acute and Sciatica dx    This RN did review the Comprehensive Spine Program in detail and what we offer for their back or neck pain  Patient is agreeable to triage and would like to proceed w/ Evaluation/Sessions with [facility] therapist     Patients information was sent to the preferred site and patient made aware clerical would be calling to schedule appointment  Contact information for the [address] [facility], site was provided for the patient as well  Patient appreciative of call   Nurse wished patient well and referral closed per protocol  Patient did not voice any questions and/or concerns  All information about patients intended careplan reviewed  Patient in agreement with nurse's explanation of referral and treatment plan

## 2022-08-31 ENCOUNTER — EVALUATION (OUTPATIENT)
Dept: PHYSICAL THERAPY | Facility: MEDICAL CENTER | Age: 37
End: 2022-08-31
Payer: COMMERCIAL

## 2022-08-31 VITALS — TEMPERATURE: 98.7 F | SYSTOLIC BLOOD PRESSURE: 124 MMHG | DIASTOLIC BLOOD PRESSURE: 82 MMHG | HEART RATE: 82 BPM

## 2022-08-31 DIAGNOSIS — M54.50 ACUTE LEFT-SIDED LOW BACK PAIN, UNSPECIFIED WHETHER SCIATICA PRESENT: ICD-10-CM

## 2022-08-31 PROCEDURE — 97161 PT EVAL LOW COMPLEX 20 MIN: CPT | Performed by: PHYSICAL THERAPIST

## 2022-08-31 NOTE — PROGRESS NOTES
PT Evaluation     Today's date: 2022  Patient name: Yamil Bazan  : 1985  MRN: [id number]  Referring provider: Bernardo Ramos PT  Dx:   Encounter Diagnosis     ICD-10-CM    1  Acute left-sided low back pain, unspecified whether sciatica present  M54 50 Ambulatory referral to PT spine                  Assessment  Assessment details: Yamil Bazan is a 30 y o  male was evaluated on 2022  for Acute left-sided low back pain, unspecified whether sciatica present  Yamil Bazan has the above listed impairments resulting in functional deficits and negative impact to quality of life  Current functional limitation is fairly mild and had a very positive response to mobilization today  Educated on home program and encouraged to stay active, likely will be back to 100% in 1-2 weeks  If lingering symptoms he may return for one additional visit for further mobilizations  Patient agrees with outlined treatment plan and all questions were answered to their satisfaction  Impairments: abnormal muscle firing, abnormal muscle tone, abnormal or restricted ROM, impaired physical strength, lacks appropriate home exercise program and pain with function  Understanding of Dx/Px/POC: good   Prognosis: good    Goals  Patient will successfully transition to home exercise program   Patient will be able to manage symptoms independently      Taylor Imus will report no pain rolling in bed  Taylor Imus will report no pain transitioning from sitting to standing       Plan  Plan details: 1-2 additional visits as needed   Patient would benefit from: skilled PT  Referral necessary: No  Planned modality interventions: thermotherapy: hydrocollator packs  Planned therapy interventions: home exercise program, manual therapy, neuromuscular re-education, patient education, functional ROM exercises, strengthening, stretching, joint mobilization, graded activity, graded exercise, therapeutic exercise, body mechanics training, motor coordination training and activity modification  Duration in weeks: 12  Treatment plan discussed with: patient        Subjective Evaluation    History of Present Illness  Mechanism of injury: Agustín Mills is a 30 y o  male presenting to therapy with complaints of left sided low back and primarily L hip and gluteal pain  He notes that pain began over the weekend with bending over to lift toilet seat  He felt immediate pull, went to  and received steroids and muscle relaxer with some minor improvement  Pain is worst during transitional movements from sitting to standing or rolling in bed  Pain does not extend past hip  No numbness or tingling  No bowel/bladder incontinence   Pain  Current pain rating: 3  At best pain ratin  At worst pain ratin  Quality: dull ache, pressure, tight and discomfort    Patient Goals  Patient goals for therapy: decreased pain, return to sport/leisure activities, independence with ADLs/IADLs, increased strength and increased motion          Objective   Red Flag Screening:  History Cancer (-)  Bowl/Bladder dysfunction (-)  Night pain (-)  Unexplained weight loss (-)     Dermatomes: WNL   Myotomes:   WNL    Reflexes:   Patellar R 2+, L 2+  Achilles R 2+, L 2+      Lumbar:  AROM:   Flexion Moderate limitation with L hip pulling  Extension Moderate limitation   Side bending L sided limitation  Rotation L sided limitation     Active Motion Observations: Restricted L sided mobility       PAIVM: Comparable sign with L3/4 UPA on L with restricted gliding appreciated     Special Tests:   Crossed SLR (-), SLR (-) Prone instability (-)   Neural Dynamic Testing: Tibial Bias (-), Peroneal Bias (-)   Slump Testing (-) with and without axial compression       Hip   WFL    Special Tests: LORIE (-), FADIR (-)                           Precautions: None       Manuals                                                                 Neuro Re-Ed Ther Ex                                                                                                                     Ther Activity                                       Gait Training                                       Modalities

## 2022-09-14 ENCOUNTER — TELEPHONE (OUTPATIENT)
Dept: PHYSICAL THERAPY | Facility: OTHER | Age: 37
End: 2022-09-14

## 2022-09-14 NOTE — TELEPHONE ENCOUNTER
Nurse reached out for follow-up call regarding recent triage completed with  [facility]     Message left containing reason for call and that no CB is necessary unless he has questions or concerns   [facility] contact information and hours of operation left as well

## 2023-03-16 ENCOUNTER — OFFICE VISIT (OUTPATIENT)
Dept: SLEEP CENTER | Facility: CLINIC | Age: 38
End: 2023-03-16

## 2023-03-16 VITALS
WEIGHT: 315 LBS | HEIGHT: 75 IN | DIASTOLIC BLOOD PRESSURE: 95 MMHG | SYSTOLIC BLOOD PRESSURE: 138 MMHG | HEART RATE: 110 BPM | BODY MASS INDEX: 39.17 KG/M2

## 2023-03-16 DIAGNOSIS — G47.33 OBSTRUCTIVE SLEEP APNEA TREATED WITH CONTINUOUS POSITIVE AIRWAY PRESSURE (CPAP): Primary | ICD-10-CM

## 2023-03-16 DIAGNOSIS — E66.01 SEVERE OBESITY (BMI >= 40) (HCC): ICD-10-CM

## 2023-03-16 DIAGNOSIS — Z99.89 OBSTRUCTIVE SLEEP APNEA TREATED WITH CONTINUOUS POSITIVE AIRWAY PRESSURE (CPAP): Primary | ICD-10-CM

## 2023-03-16 NOTE — PROGRESS NOTES
Progress Note - [address] [facility] 30 y o  male   TTY:[birth year], MRN: [id number]  3/16/2023          Follow Up Evaluation / Problem:  Severe Obstructive Sleep Apnea  Obesity      Thank you for the opportunity of participating in the evaluation and care of this patient in the Sleep Clinic at [facility]  HPI: Candido Montgomery is a 30y o  year old male  The patient presents for follow up of severe obstructive sleep apnea  He is a prior patient of Dr Blanca Marrufo  He completed a home sleep study in October 2020, which confirmed severe KENDRA with an PATSY of 46 5 and oxygen umesh of 83%  He began the use of APAP and presents to review annual compliance and effectiveness of treatment  Current Outpatient Medications:   •  methocarbamol (ROBAXIN) 750 mg tablet, Take 1 tablet (750 mg total) by mouth every 6 (six) hours as needed for muscle spasms, Disp: 15 tablet, Rfl: 0  •  Vyvanse 20 MG capsule, Take 1 capsule (20 mg total) by mouth 2 (two) times a dayMax Daily Amount: 40 mg, Disp: 60 capsule, Rfl: 0  •  zolpidem (AMBIEN) 10 mg tablet, , Disp: , Rfl:     [place] Sleepiness Scale  Sitting and reading: Would never doze  Watching TV: Slight chance of dozing  Sitting, inactive in a public place (e g  a theatre or a meeting): Would never doze  As a passenger in a car for an hour without a break: Would never doze  Lying down to rest in the afternoon when circumstances permit: Moderate chance of dozing  Sitting and talking to someone: Would never doze  Sitting quietly after a lunch without alcohol: Would never doze  In a car, while stopped for a few minutes in traffic: Would never doze  Total score: 3              Vitals:    03/16/23 1101   BP: 138/95   BP Location: Left arm   Patient Position: Sitting   Cuff Size: Large   Pulse: (!) 110   Weight: (!) 156 kg (344 lb)   Height: 6' 3" (1 905 m)       Body mass index is 43 kg/m²  EPWORTH SLEEPINESS SCORE  Total score: 3      Past History Since Last Sleep Center Visit:   He reports that he uses his CPAP equipment every night  He doesn't sleep without using it  He reports that the current setting is comfortable  He likes to sleep on his side/prone  The full face mask he currently uses gets in the way  He would like to try a different mask, if possible ]    The patient's CPAP equipment has been recalled  The patient has never used an ozone  to clean the equipment  The patient has not had any symptoms consistent with those associated with the recall  He has not registered the equipment yet  The patient reports that he cleans the equipment appropriately, using mild soap and water and changes supplies on a regular basis  The review of systems and following portions of the patient's history were reviewed and updated as appropriate: allergies, current medications, past family history, past medical history, past social history, past surgical history, and problem list         OBJECTIVE  Equipment set up date:  4/29/2020  PAP Pressure: APAP 11-13cm  Type of mask used: full face  DME Provider: [employer]    Physical Exam:     General Appearance:   Alert, cooperative, no distress, appears stated age, obese     Lungs:    Heart:  Clear to auscultation bilaterally, respirations unlabored       Tachycardic rate and regular rhythm, S1 and S2 normal, no murmur, rub or gallop              ASSESSMENT / PLAN    1  Obstructive sleep apnea treated with continuous positive airway pressure (CPAP)  PAP DME Resupply/Reorder      2   Severe obesity (BMI >= 40) ([facility])                Counseling / Coordination of Care  I have spent a total time of 25 minutes on 03/16/23 in caring for this patient including Instructions for management, Patient and family education, Importance of tx compliance, Risk factor reductions, Impressions, Counseling / Coordination of care, Documenting in the medical record, Obtaining or reviewing history   and Communicating with other healthcare professionals   Today I reviewed the patient's compliance data  he has been able to use the equipment 100% of all days recorded  Average usage was 4 or more hours 96 7% of all days recorded  The estimated AHI is 1 8 abnormal breathing events per hour  The patient feels they benefit from the use of PAP equipment and would like to continue PAP therapy  Response to treatment has been very good  A prescription for supplies has been provided to last for the next year  A mask fitting was facilitated today  We also discussed consideration of the use of a CPAP pillow for more comfortable positioning, as well as to prevent air leaks when sleeping laterally or prone  We discussed the recent recall of the patient's PAP equipment  The risks and benefits for continued use vs  Discontinuation of PAP therapy were discussed  It is ultimately the patient's decision whether or not to continue PAP therapy at this time  The patient was advised to register their equipment on the [employer], which will give them further direction in the future replacement of the equipment  Since the ozone cleaning devices have been suspected as a causative agent in the recall, the patient has been advised to avoid using any ozone cleaning device and clean the equipment using mild soap and water  He will continue using this equipment at the settings noted above for the next 12 months  At that timehe will then return for a routine follow-up evaluation  I have asked the patient to contact the [address] if he encounters any difficulties prior to that time  The following instructions have been given to the patient today:    Patient Instructions   1  Continue use of CPAP equipment nightly  2  Continue to clean your equipment, as discussed  3    Contact the [address] with any questions or concerns prior to your next visit, as needed  4  Schedule visit for follow-up in 1 year     You may want to consider use of a CPAP pillow - view these on [address]:  When: Monday through Friday 7A-5PM except holidays  Where: Our direct line is [phone]  If you are having a true emergency please call 911  In the event that the line is busy or it is after hours please leave a voice message and we will return your call  Please speak clearly, leaving your full name, birth date, best number to reach you and the reason for your call  Medication refills: We will need the name of the medication, the dosage, the ordering provider, whether you get a 30 or 90 day refill, and the pharmacy name and address  Medications will be ordered by the provider only  Nurses cannot call in prescriptions  Please allow 7 days for medication refills  Physician requested updates: If your provider requested that you call with an update after starting medication, please be ready to provide us the medication and dosage, what time you take your medication, the time you attempt to fall asleep, time you fall asleep, when you wake up, and what time you get out of bed  Sleep Study Results: We will contact you with sleep study results and/or next steps after the physician has reviewed your testing        Karl Villarreal, [address] [facility]

## 2023-03-16 NOTE — PATIENT INSTRUCTIONS
1   Continue use of CPAP equipment nightly  2  Continue to clean your equipment, as discussed  3  Contact the Sleep [address] [place] [address] with any questions or concerns prior to your next visit, as needed  4  Schedule visit for follow-up in 1 year     You may want to consider use of a CPAP pillow - view these on [address]:  When: Monday through Friday 7A-5PM except holidays  Where: Our direct line is [phone]  If you are having a true emergency please call 911  In the event that the line is busy or it is after hours please leave a voice message and we will return your call  Please speak clearly, leaving your full name, birth date, best number to reach you and the reason for your call  Medication refills: We will need the name of the medication, the dosage, the ordering provider, whether you get a 30 or 90 day refill, and the pharmacy name and address  Medications will be ordered by the provider only  Nurses cannot call in prescriptions  Please allow 7 days for medication refills  Physician requested updates: If your provider requested that you call with an update after starting medication, please be ready to provide us the medication and dosage, what time you take your medication, the time you attempt to fall asleep, time you fall asleep, when you wake up, and what time you get out of bed  Sleep Study Results: We will contact you with sleep study results and/or next steps after the physician has reviewed your testing

## 2023-03-17 ENCOUNTER — TELEPHONE (OUTPATIENT)
Dept: SLEEP CENTER | Facility: CLINIC | Age: 38
End: 2023-03-17

## 2023-03-20 LAB

## 2023-11-03 ENCOUNTER — OFFICE VISIT (OUTPATIENT)
Dept: FAMILY MEDICINE CLINIC | Facility: CLINIC | Age: 38
End: 2023-11-03
Payer: COMMERCIAL

## 2023-11-03 VITALS
SYSTOLIC BLOOD PRESSURE: 140 MMHG | HEART RATE: 76 BPM | WEIGHT: 315 LBS | OXYGEN SATURATION: 98 % | HEIGHT: 75 IN | TEMPERATURE: 98.2 F | BODY MASS INDEX: 39.17 KG/M2 | DIASTOLIC BLOOD PRESSURE: 78 MMHG

## 2023-11-03 DIAGNOSIS — F90.9 ATTENTION DEFICIT HYPERACTIVITY DISORDER (ADHD), UNSPECIFIED ADHD TYPE: Primary | ICD-10-CM

## 2023-11-03 DIAGNOSIS — M67.479 DIGITAL MUCINOUS CYST OF TOE: ICD-10-CM

## 2023-11-03 PROCEDURE — 99214 OFFICE O/P EST MOD 30 MIN: CPT | Performed by: FAMILY MEDICINE

## 2023-11-03 RX ORDER — LISDEXAMFETAMINE DIMESYLATE 20 MG/1
20 CAPSULE ORAL EVERY MORNING
Qty: 30 CAPSULE | Refills: 0 | Status: SHIPPED | OUTPATIENT
Start: 2023-11-03 | End: 2023-12-03

## 2023-11-03 NOTE — PROGRESS NOTES
Assessment/Plan:   1. Attention deficit hyperactivity disorder (ADHD), unspecified ADHD type  Symptoms have been very well controlled with Vyvanse 20 mg. PDMP reviewed, no abnormality seen today. Refill this medication for him. - Vyvanse 20 MG capsule; Take 1 capsule (20 mg total) by mouth every morning Max Daily Amount: 20 mg  Dispense: 30 capsule; Refill: 0    2. Digital mucinous cyst of toe  Symptoms appear likely secondary to a mucous cyst.  At this time, will refer patient to podiatry for further evaluation. Depression Screening and Follow-up Plan: Patient was screened for depression during today's encounter. They screened negative with a PHQ-2 score of 0. Diagnoses and all orders for this visit:    Attention deficit hyperactivity disorder (ADHD), combined type    Attention deficit hyperactivity disorder (ADHD), unspecified ADHD type  -     Vyvanse 20 MG capsule; Take 1 capsule (20 mg total) by mouth every morning Max Daily Amount: 20 mg          Subjective:       Chief Complaint   Patient presents with    Toe Pain     Right toe discomfort (months) stated a blister or ingrown hair popped about 2 times but still has not gone away      Patient ID: Francisca Issa is a 41 y.o. male presents today for a follow-up on his ADHD. He states that he would like to restart his Vyvanse. He is scheduled to see a psychiatrist in [place]. He does have an acute complaint today of a small cyst over his right second toe. Has had this for the past few months. He states that he does drain and heal however it has been recurrent. Denies any pain. HPI    Review of Systems   Constitutional:  Negative for activity change, chills, fatigue and fever. HENT:  Negative for congestion, ear pain, sinus pressure and sore throat. Eyes:  Negative for redness, itching and visual disturbance. Respiratory:  Negative for cough and shortness of breath. Cardiovascular:  Negative for chest pain and palpitations. Gastrointestinal:  Negative for abdominal pain, diarrhea and nausea. Endocrine: Negative for cold intolerance and heat intolerance. Genitourinary:  Negative for dysuria, flank pain and frequency. Musculoskeletal:  Negative for arthralgias, back pain, gait problem and myalgias. Skin:  Positive for rash. Negative for color change. Allergic/Immunologic: Negative for environmental allergies. Neurological:  Negative for dizziness, numbness and headaches. Psychiatric/Behavioral:  Negative for behavioral problems and sleep disturbance. The following portions of the patient's history were reviewed and updated as appropriate : past family history, past medical history, past social history and past surgical history. Current Outpatient Medications:     Vyvanse 20 MG capsule, Take 1 capsule (20 mg total) by mouth every morning Max Daily Amount: 20 mg, Disp: 30 capsule, Rfl: 0         Objective:         Vitals:    11/03/23 1052   BP: 140/78   BP Location: Left arm   Patient Position: Sitting   Cuff Size: Large   Pulse: 76   Temp: 98.2 °F (36.8 °C)   TempSrc: Tympanic   SpO2: 98%   Weight: (!) 160 kg (353 lb)   Height: 6' 3" (1.905 m)     Physical Exam  Vitals reviewed. Constitutional:       Appearance: Normal appearance. He is well-developed. HENT:      Head: Normocephalic and atraumatic. Right Ear: Hearing normal.      Left Ear: Hearing normal.      Nose: Nose normal. No septal deviation. Eyes:      General: Lids are normal.      Pupils: Pupils are equal, round, and reactive to light. Neck:      Thyroid: No thyroid mass or thyromegaly. Trachea: Trachea normal.   Cardiovascular:      Rate and Rhythm: Normal rate. Pulmonary:      Effort: Pulmonary effort is normal. No respiratory distress. Breath sounds: No decreased breath sounds. Abdominal:      Tenderness: There is no guarding. Musculoskeletal:         General: Normal range of motion.       Cervical back: Normal range of motion. Feet:    Feet:      Comments: Mucous cyst over right second toe. Skin:     General: Skin is warm and dry. Neurological:      Mental Status: He is alert and oriented to person, place, and time. Cranial Nerves: No cranial nerve deficit. Sensory: No sensory deficit. Psychiatric:         Speech: Speech normal.         Behavior: Behavior normal.         Thought Content:  Thought content normal.         Judgment: Judgment normal.

## 2023-12-01 ENCOUNTER — TELEPHONE (OUTPATIENT)
Dept: FAMILY MEDICINE CLINIC | Facility: CLINIC | Age: 38
End: 2023-12-01

## 2023-12-01 DIAGNOSIS — F90.9 ATTENTION DEFICIT HYPERACTIVITY DISORDER (ADHD), UNSPECIFIED ADHD TYPE: Primary | ICD-10-CM

## 2023-12-01 RX ORDER — LISDEXAMFETAMINE DIMESYLATE CAPSULES 20 MG/1
20 CAPSULE ORAL EVERY MORNING
Qty: 30 CAPSULE | Refills: 0 | Status: SHIPPED | OUTPATIENT
Start: 2023-12-01

## 2023-12-01 NOTE — TELEPHONE ENCOUNTER
Pt is requesting the generic prescription for the Vyvanse 20 mg. Pt stated that prescription cost difference is huge between the generic and the Vyvanse. Pt also asking that it be sent to the [facility].

## 2024-01-09 DIAGNOSIS — F90.9 ATTENTION DEFICIT HYPERACTIVITY DISORDER (ADHD), UNSPECIFIED ADHD TYPE: ICD-10-CM

## 2024-01-09 RX ORDER — LISDEXAMFETAMINE DIMESYLATE CAPSULES 20 MG/1
20 CAPSULE ORAL EVERY MORNING
Qty: 30 CAPSULE | Refills: 0 | Status: SHIPPED | OUTPATIENT
Start: 2024-01-09

## 2024-01-10 ENCOUNTER — APPOINTMENT (OUTPATIENT)
Dept: RADIOLOGY | Facility: CLINIC | Age: 39
End: 2024-01-10
Payer: COMMERCIAL

## 2024-01-10 ENCOUNTER — OFFICE VISIT (OUTPATIENT)
Dept: PODIATRY | Facility: CLINIC | Age: 39
End: 2024-01-10
Payer: COMMERCIAL

## 2024-01-10 VITALS
HEIGHT: 75 IN | RESPIRATION RATE: 18 BRPM | SYSTOLIC BLOOD PRESSURE: 132 MMHG | DIASTOLIC BLOOD PRESSURE: 88 MMHG | WEIGHT: 315 LBS | BODY MASS INDEX: 39.17 KG/M2 | HEART RATE: 80 BPM

## 2024-01-10 DIAGNOSIS — M20.61 TOE DEFORMITY, ACQUIRED, RIGHT: ICD-10-CM

## 2024-01-10 DIAGNOSIS — M72.2 PLANTAR FASCIITIS: ICD-10-CM

## 2024-01-10 DIAGNOSIS — M67.479 DIGITAL MUCINOUS CYST OF TOE: ICD-10-CM

## 2024-01-10 DIAGNOSIS — M72.2 PLANTAR FASCIITIS: Primary | ICD-10-CM

## 2024-01-10 PROCEDURE — 73630 X-RAY EXAM OF FOOT: CPT

## 2024-01-10 PROCEDURE — 99244 OFF/OP CNSLTJ NEW/EST MOD 40: CPT | Performed by: PODIATRIST

## 2024-01-10 RX ORDER — MELOXICAM 15 MG/1
15 TABLET ORAL DAILY
Qty: 30 TABLET | Refills: 0 | Status: SHIPPED | OUTPATIENT
Start: 2024-01-10 | End: 2024-02-09

## 2024-01-10 NOTE — PROGRESS NOTES
Assessment/Plan:    I personally reviewed x-rays of the right foot taken today.  They reveal no evidence of osseous pathology.  No inferior heel spur noted.    Explained to patient that he is dealing with a mucoid cyst of the right second toe.  Explained that surgical intervention is needed for correction.  The surgery involves reexcision of the cyst along with the head of the middle phalanx of the right second toe.  Patient desires this procedure as soon as possible.    Explained also the patient is dealing with plantars fasciitis of the right foot.  Recommended stretching exercises and the wearing of supportive shoes.  Patient urged to refrain from walking barefoot.  Patient placed on meloxicam 15 mg daily.  Recommended cortisone injection at the time of his mucoid cyst surgery.    Procedures were explained including pre and postoperative course, risk and possible complications.  The consent form was signed.    No problem-specific Assessment & Plan notes found for this encounter.       Diagnoses and all orders for this visit:    Plantar fasciitis  -     X-ray foot right 3+ views; Future  -     meloxicam (MOBIC) 15 mg tablet; Take 1 tablet (15 mg total) by mouth daily    Digital mucinous cyst of toe  -     Ambulatory Referral to Podiatry  -     X-ray foot right 3+ views; Future          Subjective:      Patient ID: Russ Bowen is a 38 y.o. male.    HPI    Patient, a 38-year-old male in apparent good health presents with multiple pedal disorders each affecting the right foot.  Patient relates the presence of a painful cyst on the top of the right second toe.  This cyst drained intermittently typically a gelatinous fluid.  It is painful with shoe pressure and always tends to recur after it loses.    A second concern involves right heel pain.  Patient states that his heel has been painful for years.  There has been no heel trauma.  Patient has not had any medical treatment for the heel pain.  No left heel pain  "reported.    The following portions of the patient's history were reviewed and updated as appropriate: allergies, current medications, past family history, past medical history, past social history, past surgical history, and problem list.    Review of Systems   Gastrointestinal: Negative.    Neurological:  Negative for numbness.   Psychiatric/Behavioral: Negative.               Objective:      /88   Pulse 80   Resp 18   Ht 6' 3\" (1.905 m)   Wt (!) 160 kg (352 lb)   BMI 44.00 kg/m²          Physical Exam  Constitutional:       Appearance: He is obese.   Cardiovascular:      Pulses: Normal pulses.   Musculoskeletal:         General: Deformity present.      Comments: Mucoid cyst noted DIPJ right second toe.  Pain with palpation medial aspect right heel at fascia insertion into calcaneus.   Skin:     Comments: Mucoid cyst DIPJ right second toe   Neurological:      General: No focal deficit present.      Mental Status: He is oriented to person, place, and time.               "

## 2024-01-12 DIAGNOSIS — Z01.818 PRE-OP EVALUATION: Primary | ICD-10-CM

## 2024-01-22 ENCOUNTER — RA CDI HCC (OUTPATIENT)
Dept: OTHER | Facility: HOSPITAL | Age: 39
End: 2024-01-22

## 2024-01-22 ENCOUNTER — APPOINTMENT (OUTPATIENT)
Dept: LAB | Facility: CLINIC | Age: 39
End: 2024-01-22
Payer: COMMERCIAL

## 2024-01-22 ENCOUNTER — TELEPHONE (OUTPATIENT)
Dept: FAMILY MEDICINE CLINIC | Facility: CLINIC | Age: 39
End: 2024-01-22

## 2024-01-22 DIAGNOSIS — Z01.818 PRE-OP EVALUATION: ICD-10-CM

## 2024-01-22 LAB
ANION GAP SERPL CALCULATED.3IONS-SCNC: 5 MMOL/L
BASOPHILS # BLD AUTO: 0.07 THOUSANDS/ÂΜL (ref 0–0.1)
BASOPHILS NFR BLD AUTO: 1 % (ref 0–1)
BUN SERPL-MCNC: 13 MG/DL (ref 5–25)
CALCIUM SERPL-MCNC: 9 MG/DL (ref 8.4–10.2)
CHLORIDE SERPL-SCNC: 106 MMOL/L (ref 96–108)
CO2 SERPL-SCNC: 28 MMOL/L (ref 21–32)
CREAT SERPL-MCNC: 1.03 MG/DL (ref 0.6–1.3)
EOSINOPHIL # BLD AUTO: 0.2 THOUSAND/ÂΜL (ref 0–0.61)
EOSINOPHIL NFR BLD AUTO: 3 % (ref 0–6)
ERYTHROCYTE [DISTWIDTH] IN BLOOD BY AUTOMATED COUNT: 13.5 % (ref 11.6–15.1)
GFR SERPL CREATININE-BSD FRML MDRD: 93 ML/MIN/1.73SQ M
GLUCOSE SERPL-MCNC: 90 MG/DL (ref 65–140)
HCT VFR BLD AUTO: 49 % (ref 36.5–49.3)
HGB BLD-MCNC: 15.7 G/DL (ref 12–17)
IMM GRANULOCYTES # BLD AUTO: 0.04 THOUSAND/UL (ref 0–0.2)
IMM GRANULOCYTES NFR BLD AUTO: 1 % (ref 0–2)
LYMPHOCYTES # BLD AUTO: 2.02 THOUSANDS/ÂΜL (ref 0.6–4.47)
LYMPHOCYTES NFR BLD AUTO: 31 % (ref 14–44)
MCH RBC QN AUTO: 27.4 PG (ref 26.8–34.3)
MCHC RBC AUTO-ENTMCNC: 32 G/DL (ref 31.4–37.4)
MCV RBC AUTO: 85 FL (ref 82–98)
MONOCYTES # BLD AUTO: 0.61 THOUSAND/ÂΜL (ref 0.17–1.22)
MONOCYTES NFR BLD AUTO: 9 % (ref 4–12)
NEUTROPHILS # BLD AUTO: 3.57 THOUSANDS/ÂΜL (ref 1.85–7.62)
NEUTS SEG NFR BLD AUTO: 55 % (ref 43–75)
NRBC BLD AUTO-RTO: 0 /100 WBCS
PLATELET # BLD AUTO: 280 THOUSANDS/UL (ref 149–390)
PMV BLD AUTO: 9.1 FL (ref 8.9–12.7)
POTASSIUM SERPL-SCNC: 4.4 MMOL/L (ref 3.5–5.3)
RBC # BLD AUTO: 5.74 MILLION/UL (ref 3.88–5.62)
SODIUM SERPL-SCNC: 139 MMOL/L (ref 135–147)
WBC # BLD AUTO: 6.51 THOUSAND/UL (ref 4.31–10.16)

## 2024-01-22 PROCEDURE — 36415 COLL VENOUS BLD VENIPUNCTURE: CPT

## 2024-01-22 PROCEDURE — 85025 COMPLETE CBC W/AUTO DIFF WBC: CPT

## 2024-01-22 PROCEDURE — 80048 BASIC METABOLIC PNL TOTAL CA: CPT

## 2024-01-22 NOTE — PROGRESS NOTES
NOT on the BPA- please assess using MEAT for 2024 billing        HCC coding opportunities          Chart Reviewed number of suggestions sent to Provider: 1     Patients Insurance        Commercial Insurance: [employer]

## 2024-01-23 ENCOUNTER — CONSULT (OUTPATIENT)
Dept: FAMILY MEDICINE CLINIC | Facility: CLINIC | Age: 39
End: 2024-01-23
Payer: COMMERCIAL

## 2024-01-23 VITALS
DIASTOLIC BLOOD PRESSURE: 80 MMHG | OXYGEN SATURATION: 98 % | WEIGHT: 315 LBS | BODY MASS INDEX: 44.37 KG/M2 | SYSTOLIC BLOOD PRESSURE: 120 MMHG | TEMPERATURE: 98.5 F | HEART RATE: 81 BPM

## 2024-01-23 DIAGNOSIS — Z01.818 PRE-OP EXAM: Primary | ICD-10-CM

## 2024-01-23 DIAGNOSIS — M20.61 ACQUIRED DEFORMITY OF RIGHT TOE: ICD-10-CM

## 2024-01-23 PROCEDURE — 99244 OFF/OP CNSLTJ NEW/EST MOD 40: CPT | Performed by: FAMILY MEDICINE

## 2024-01-23 PROCEDURE — 93000 ELECTROCARDIOGRAM COMPLETE: CPT | Performed by: FAMILY MEDICINE

## 2024-01-23 NOTE — PROGRESS NOTES
Assessment/Plan:   1. Pre-op exam/Acquired deformity of right toe  Patient appears clinically stable today.  He has a good functional capacity and no active cardiac problems.  Reviewed his preop blood work and EKG.  EKG appeared to show normal sinus rhythm.  Blood work all appeared stable.  This type of procedure is considered low risk.  He is cleared with low perioperative cardiovascular risk.  No further testing needed today.  - POCT ECG    2. BMI 40.0-44.9, adult (HCC)  - Ambulatory Referral to Weight Management; Future           Diagnoses and all orders for this visit:    Pre-op exam  -     POCT ECG    Acquired deformity of right toe    BMI 40.0-44.9, adult (HCC)  -     Ambulatory Referral to Weight Management; Future          Subjective:       Chief Complaint   Patient presents with    Pre-op Exam     Medical clearance prior to foot surgery with Dr. Zacarias 2/6/24  REPAIR HAMMERTOE / MALLET TOE / CLAW TOE (Right: Foot)       CAPSULECTOMY (Right: Foot)         Patient ID: Russ Bowen is a 38 y.o. male.    Russ Bowen  38 y.o.  male    SURGEON: Dr. Zacarias    SURGERY/PROCEDURE: REPAIR HAMMERTOE / MALLET TOE / CLAW TOE (Right: Foot)       CAPSULECTOMY (Right: Foot)     DATE OF SURGERY: 2/6/24    PRIOR ANESTHESIA:yes    COMPLICATION: no    BLEEDING PROBLEM: no    PERTINENT PMH: no    EXERCISE CAPACITY:   CAN WALK 4 BLOCKS AND OR CLIMB 2 FLIGHTS: Yes    HOME LIVING SITUATION SAFE AND SECURE: Yes      TOBACCO: no     ETOH: no     ILLEGAL DRUGS: no          Review of Systems   Constitutional:  Negative for activity change, chills, fatigue and fever.   HENT:  Negative for congestion, ear pain, sinus pressure and sore throat.    Eyes:  Negative for redness, itching and visual disturbance.   Respiratory:  Negative for cough and shortness of breath.    Cardiovascular:  Negative for chest pain and palpitations.   Gastrointestinal:  Negative for abdominal pain, diarrhea and nausea.   Endocrine: Negative for cold  intolerance and heat intolerance.   Genitourinary:  Negative for dysuria, flank pain and frequency.   Musculoskeletal:  Negative for arthralgias, back pain, gait problem and myalgias.   Skin:  Negative for color change.   Allergic/Immunologic: Negative for environmental allergies.   Neurological:  Negative for dizziness, numbness and headaches.   Psychiatric/Behavioral:  Negative for behavioral problems and sleep disturbance.        The following portions of the patient's history were reviewed and updated as appropriate : past family history, past medical history, past social history and past surgical history.    Current Outpatient Medications:     lisdexamfetamine (VYVANSE) 20 MG capsule, Take 1 capsule (20 mg total) by mouth every morning Max Daily Amount: 20 mg, Disp: 30 capsule, Rfl: 0    meloxicam (MOBIC) 15 mg tablet, Take 1 tablet (15 mg total) by mouth daily, Disp: 30 tablet, Rfl: 0         Objective:         Vitals:    01/23/24 1315   BP: 120/80   BP Location: Left arm   Patient Position: Sitting   Cuff Size: Large   Pulse: 81   Temp: 98.5 °F (36.9 °C)   SpO2: 98%   Weight: (!) 161 kg (355 lb)     Physical Exam  Vitals reviewed.   Constitutional:       Appearance: He is well-developed.   HENT:      Head: Normocephalic and atraumatic.      Nose: Nose normal.      Mouth/Throat:      Pharynx: No oropharyngeal exudate.   Eyes:      General: No scleral icterus.        Right eye: No discharge.         Left eye: No discharge.      Pupils: Pupils are equal, round, and reactive to light.   Neck:      Trachea: No tracheal deviation.   Cardiovascular:      Rate and Rhythm: Normal rate and regular rhythm.      Pulses:           Dorsalis pedis pulses are 2+ on the right side and 2+ on the left side.        Posterior tibial pulses are 2+ on the right side and 2+ on the left side.      Heart sounds: Normal heart sounds. No murmur heard.     No friction rub. No gallop.   Pulmonary:      Effort: Pulmonary effort is normal.  No respiratory distress.      Breath sounds: Normal breath sounds. No wheezing or rales.   Abdominal:      General: Bowel sounds are normal. There is no distension.      Palpations: Abdomen is soft.      Tenderness: There is no abdominal tenderness. There is no guarding or rebound.   Musculoskeletal:         General: Normal range of motion.      Cervical back: Normal range of motion and neck supple.   Lymphadenopathy:      Head:      Right side of head: No submental or submandibular adenopathy.      Left side of head: No submental or submandibular adenopathy.      Cervical: No cervical adenopathy.      Right cervical: No superficial, deep or posterior cervical adenopathy.     Left cervical: No superficial, deep or posterior cervical adenopathy.   Skin:     General: Skin is warm and dry.      Findings: No erythema.   Neurological:      Mental Status: He is alert and oriented to person, place, and time.      Cranial Nerves: No cranial nerve deficit.      Sensory: No sensory deficit.   Psychiatric:         Mood and Affect: Mood is not anxious or depressed.         Speech: Speech normal.         Behavior: Behavior normal.         Thought Content: Thought content normal.         Judgment: Judgment normal.

## 2024-02-03 ENCOUNTER — ANESTHESIA EVENT (OUTPATIENT)
Dept: PERIOP | Facility: HOSPITAL | Age: 39
End: 2024-02-03
Payer: COMMERCIAL

## 2024-02-05 NOTE — PRE-PROCEDURE INSTRUCTIONS
Pre-Surgery Instructions:   Medication Instructions    lisdexamfetamine (VYVANSE) 20 MG capsule Hold day of surgery.    Medication instructions for day surgery reviewed. Please use only a sip of water to take your instructed medications. Avoid all over the counter vitamins, supplements and NSAIDS for one week prior to surgery per anesthesia guidelines. Tylenol is ok to take as needed.     You will receive a call one business day prior to surgery with an arrival time and hospital directions. If your surgery is scheduled on a Monday, the hospital will be calling you on the Friday prior to your surgery. If you have not heard from anyone by 8pm, please call the hospital supervisor through the hospital  at [phone]. (Foreign [phone]).    Do not eat or drink anything after midnight the night before your surgery, including candy, mints, lifesavers, or chewing gum. Do not drink alcohol 24hrs before your surgery. Try not to smoke at least 24hrs before your surgery.       Follow the pre surgery showering instructions as listed in the “My Surgical Experience Booklet” or otherwise provided by your surgeon's office. Do not use a blade to shave the surgical area 1 week before surgery. It is okay to use a clean electric clippers up to 24 hours before surgery. Do not apply any lotions, creams, including makeup, cologne, deodorant, or perfumes after showering on the day of your surgery. Do not use dry shampoo, hair spray, hair gel, or any type of hair products.     No contact lenses, eye make-up, or artificial eyelashes. Remove nail polish, including gel polish, and any artificial, gel, or acrylic nails if possible. Remove all jewelry including rings and body piercing jewelry.     Wear causal clothing that is easy to take on and off. Consider your type of surgery.    Keep any valuables, jewelry, piercings at home. Please bring any specially ordered equipment (sling, braces) if indicated.    Arrange for a  responsible person to drive you to and from the hospital on the day of your surgery. Visitor Guidelines discussed.     Call the surgeon's office with any new illnesses, exposures, or additional questions prior to surgery.    Please reference your “My Surgical Experience Booklet” for additional information to prepare for your upcoming surgery.

## 2024-02-05 NOTE — ANESTHESIA PREPROCEDURE EVALUATION
Procedure:  REPAIR HAMMERTOE / MALLET TOE / CLAW TOE (Right: Foot)  CAPSULECTOMY (Right: Foot)    Relevant Problems   ANESTHESIA (within normal limits)      CARDIO (within normal limits)      ENDO (within normal limits)      GI/HEPATIC (within normal limits)      /RENAL (within normal limits)      GYN (within normal limits)      HEMATOLOGY (within normal limits)      MUSCULOSKELETAL (within normal limits)      NEURO/PSYCH (within normal limits)      PULMONARY   (+) Obstructive sleep apnea treated with continuous positive airway pressure (CPAP)   (+) Sleep apnea      Other   (+) Attention deficit disorder   (+) Severe obesity (BMI >= 40) (HCC)        Physical Exam    Airway    Mallampati score: III  TM Distance: >3 FB  Neck ROM: full     Dental   Comment: Molar caps     Cardiovascular  Rhythm: regular, Rate: normal    Pulmonary   Breath sounds clear to auscultation    Other Findings        Anesthesia Plan  ASA Score- 3     Anesthesia Type- general with ASA Monitors.         Additional Monitors:     Airway Plan: LMA.           Plan Factors-Exercise tolerance (METS): >4 METS.    Chart reviewed.   Existing labs reviewed. Patient summary reviewed.    Patient is not a current smoker.              Induction- intravenous.    Postoperative Plan- Plan for postoperative opioid use.     Informed Consent- Anesthetic plan and risks discussed with patient.

## 2024-02-06 ENCOUNTER — ANESTHESIA (OUTPATIENT)
Dept: PERIOP | Facility: HOSPITAL | Age: 39
End: 2024-02-06
Payer: COMMERCIAL

## 2024-02-06 ENCOUNTER — APPOINTMENT (OUTPATIENT)
Dept: RADIOLOGY | Facility: HOSPITAL | Age: 39
End: 2024-02-06
Payer: COMMERCIAL

## 2024-02-06 ENCOUNTER — HOSPITAL ENCOUNTER (OUTPATIENT)
Facility: HOSPITAL | Age: 39
Setting detail: OUTPATIENT SURGERY
Discharge: HOME | End: 2024-02-06
Attending: PODIATRIST | Admitting: PODIATRIST
Payer: COMMERCIAL

## 2024-02-06 VITALS
WEIGHT: 315 LBS | OXYGEN SATURATION: 97 % | RESPIRATION RATE: 16 BRPM | DIASTOLIC BLOOD PRESSURE: 63 MMHG | SYSTOLIC BLOOD PRESSURE: 114 MMHG | HEART RATE: 54 BPM | BODY MASS INDEX: 39.17 KG/M2 | HEIGHT: 75 IN | TEMPERATURE: 96.7 F

## 2024-02-06 DIAGNOSIS — G89.18 POST-OP PAIN: Primary | ICD-10-CM

## 2024-02-06 DIAGNOSIS — M20.61 TOE DEFORMITY, ACQUIRED, RIGHT: ICD-10-CM

## 2024-02-06 PROCEDURE — 88304 TISSUE EXAM BY PATHOLOGIST: CPT | Performed by: PATHOLOGY

## 2024-02-06 PROCEDURE — 28285 REPAIR OF HAMMERTOE: CPT | Performed by: PODIATRIST

## 2024-02-06 PROCEDURE — 20551 NJX 1 TENDON ORIGIN/INSJ: CPT | Performed by: PODIATRIST

## 2024-02-06 PROCEDURE — 28092 REMOVAL OF TOE LESIONS: CPT | Performed by: PODIATRIST

## 2024-02-06 PROCEDURE — 99024 POSTOP FOLLOW-UP VISIT: CPT | Performed by: PODIATRIST

## 2024-02-06 PROCEDURE — 73630 X-RAY EXAM OF FOOT: CPT

## 2024-02-06 RX ORDER — FENTANYL CITRATE 50 UG/ML
INJECTION, SOLUTION INTRAMUSCULAR; INTRAVENOUS AS NEEDED
Status: DISCONTINUED | OUTPATIENT
Start: 2024-02-06 | End: 2024-02-06

## 2024-02-06 RX ORDER — SODIUM CHLORIDE, SODIUM LACTATE, POTASSIUM CHLORIDE, CALCIUM CHLORIDE 600; 310; 30; 20 MG/100ML; MG/100ML; MG/100ML; MG/100ML
125 INJECTION, SOLUTION INTRAVENOUS CONTINUOUS
Status: DISCONTINUED | OUTPATIENT
Start: 2024-02-06 | End: 2024-02-06 | Stop reason: HOSPADM

## 2024-02-06 RX ORDER — LIDOCAINE HYDROCHLORIDE 10 MG/ML
INJECTION, SOLUTION EPIDURAL; INFILTRATION; INTRACAUDAL; PERINEURAL AS NEEDED
Status: DISCONTINUED | OUTPATIENT
Start: 2024-02-06 | End: 2024-02-06 | Stop reason: HOSPADM

## 2024-02-06 RX ORDER — MAGNESIUM HYDROXIDE 1200 MG/15ML
LIQUID ORAL AS NEEDED
Status: DISCONTINUED | OUTPATIENT
Start: 2024-02-06 | End: 2024-02-06 | Stop reason: HOSPADM

## 2024-02-06 RX ORDER — MEPERIDINE HYDROCHLORIDE 25 MG/ML
12.5 INJECTION INTRAMUSCULAR; INTRAVENOUS; SUBCUTANEOUS
Status: DISCONTINUED | OUTPATIENT
Start: 2024-02-06 | End: 2024-02-06 | Stop reason: HOSPADM

## 2024-02-06 RX ORDER — OXYCODONE HYDROCHLORIDE 5 MG/1
5 TABLET ORAL ONCE AS NEEDED
Status: DISCONTINUED | OUTPATIENT
Start: 2024-02-06 | End: 2024-02-06 | Stop reason: HOSPADM

## 2024-02-06 RX ORDER — OXYCODONE HYDROCHLORIDE AND ACETAMINOPHEN 5; 325 MG/1; MG/1
1 TABLET ORAL EVERY 4 HOURS PRN
Qty: 10 TABLET | Refills: 0 | Status: SHIPPED | OUTPATIENT
Start: 2024-02-06 | End: 2024-02-16

## 2024-02-06 RX ORDER — MIDAZOLAM HYDROCHLORIDE 2 MG/2ML
INJECTION, SOLUTION INTRAMUSCULAR; INTRAVENOUS AS NEEDED
Status: DISCONTINUED | OUTPATIENT
Start: 2024-02-06 | End: 2024-02-06

## 2024-02-06 RX ORDER — BUPIVACAINE HYDROCHLORIDE 5 MG/ML
INJECTION, SOLUTION EPIDURAL; INTRACAUDAL AS NEEDED
Status: DISCONTINUED | OUTPATIENT
Start: 2024-02-06 | End: 2024-02-06 | Stop reason: HOSPADM

## 2024-02-06 RX ORDER — LIDOCAINE HYDROCHLORIDE 20 MG/ML
INJECTION, SOLUTION EPIDURAL; INFILTRATION; INTRACAUDAL; PERINEURAL AS NEEDED
Status: DISCONTINUED | OUTPATIENT
Start: 2024-02-06 | End: 2024-02-06

## 2024-02-06 RX ORDER — DEXAMETHASONE SODIUM PHOSPHATE 10 MG/ML
INJECTION, SOLUTION INTRAMUSCULAR; INTRAVENOUS AS NEEDED
Status: DISCONTINUED | OUTPATIENT
Start: 2024-02-06 | End: 2024-02-06

## 2024-02-06 RX ORDER — FENTANYL CITRATE/PF 50 MCG/ML
25 SYRINGE (ML) INJECTION
Status: DISCONTINUED | OUTPATIENT
Start: 2024-02-06 | End: 2024-02-06 | Stop reason: HOSPADM

## 2024-02-06 RX ORDER — ONDANSETRON 2 MG/ML
INJECTION INTRAMUSCULAR; INTRAVENOUS AS NEEDED
Status: DISCONTINUED | OUTPATIENT
Start: 2024-02-06 | End: 2024-02-06

## 2024-02-06 RX ORDER — ONDANSETRON 2 MG/ML
4 INJECTION INTRAMUSCULAR; INTRAVENOUS ONCE AS NEEDED
Status: DISCONTINUED | OUTPATIENT
Start: 2024-02-06 | End: 2024-02-06 | Stop reason: HOSPADM

## 2024-02-06 RX ORDER — HYDROMORPHONE HCL/PF 1 MG/ML
0.5 SYRINGE (ML) INJECTION
Status: DISCONTINUED | OUTPATIENT
Start: 2024-02-06 | End: 2024-02-06 | Stop reason: HOSPADM

## 2024-02-06 RX ORDER — PROPOFOL 10 MG/ML
INJECTION, EMULSION INTRAVENOUS AS NEEDED
Status: DISCONTINUED | OUTPATIENT
Start: 2024-02-06 | End: 2024-02-06

## 2024-02-06 RX ORDER — GLYCOPYRROLATE 0.2 MG/ML
INJECTION INTRAMUSCULAR; INTRAVENOUS AS NEEDED
Status: DISCONTINUED | OUTPATIENT
Start: 2024-02-06 | End: 2024-02-06

## 2024-02-06 RX ORDER — TRIAMCINOLONE ACETONIDE 40 MG/ML
INJECTION, SUSPENSION INTRA-ARTICULAR; INTRAMUSCULAR AS NEEDED
Status: DISCONTINUED | OUTPATIENT
Start: 2024-02-06 | End: 2024-02-06 | Stop reason: HOSPADM

## 2024-02-06 RX ADMIN — SODIUM CHLORIDE, SODIUM LACTATE, POTASSIUM CHLORIDE, AND CALCIUM CHLORIDE 125 ML/HR: .6; .31; .03; .02 INJECTION, SOLUTION INTRAVENOUS at 08:30

## 2024-02-06 RX ADMIN — FENTANYL CITRATE 50 MCG: 50 INJECTION INTRAMUSCULAR; INTRAVENOUS at 07:32

## 2024-02-06 RX ADMIN — GLYCOPYRROLATE 0.2 MG: 0.2 INJECTION, SOLUTION INTRAMUSCULAR; INTRAVENOUS at 07:32

## 2024-02-06 RX ADMIN — MIDAZOLAM 2 MG: 1 INJECTION INTRAMUSCULAR; INTRAVENOUS at 07:26

## 2024-02-06 RX ADMIN — SODIUM CHLORIDE, SODIUM LACTATE, POTASSIUM CHLORIDE, AND CALCIUM CHLORIDE 125 ML/HR: .6; .31; .03; .02 INJECTION, SOLUTION INTRAVENOUS at 05:55

## 2024-02-06 RX ADMIN — LIDOCAINE HYDROCHLORIDE 100 MG: 20 INJECTION, SOLUTION EPIDURAL; INFILTRATION; INTRACAUDAL at 07:30

## 2024-02-06 RX ADMIN — DEXAMETHASONE SODIUM PHOSPHATE 5 MG: 10 INJECTION INTRAMUSCULAR; INTRAVENOUS at 07:32

## 2024-02-06 RX ADMIN — PROPOFOL 300 MG: 10 INJECTION, EMULSION INTRAVENOUS at 07:30

## 2024-02-06 RX ADMIN — Medication 3000 MG: at 07:32

## 2024-02-06 RX ADMIN — PROPOFOL 50 MG: 10 INJECTION, EMULSION INTRAVENOUS at 07:32

## 2024-02-06 RX ADMIN — ONDANSETRON 4 MG: 2 INJECTION INTRAMUSCULAR; INTRAVENOUS at 07:30

## 2024-02-06 RX ADMIN — FENTANYL CITRATE 50 MCG: 50 INJECTION INTRAMUSCULAR; INTRAVENOUS at 07:42

## 2024-02-06 NOTE — ANESTHESIA POSTPROCEDURE EVALUATION
"Post-Op Assessment Note    CV Status:  Stable    Pain management: adequate       Mental Status:  Alert and awake   Hydration Status:  Euvolemic   PONV Controlled:  Controlled   Airway Patency:  Patent     Post Op Vitals Reviewed: Yes    No anethesia notable event occurred.    Staff: Anesthesiologist               BP      Temp      Pulse     Resp      SpO2      /63   Pulse (!) 54   Temp (!) 96.7 °F (35.9 °C) (Temporal)   Resp 16   Ht 6' 3\" (1.905 m)   Wt (!) 162 kg (356 lb 11.3 oz)   SpO2 97%   BMI 44.58 kg/m²     "

## 2024-02-06 NOTE — DISCHARGE SUMMARY
Discharge Summary Outpatient Procedure Podiatry -   Russ Bowen 38 y.o. male MRN: [id number]  Unit/Bed#: OR POOL Encounter: [id number]    Admission Date: 2/6/2024     Admitting Diagnosis: Toe deformity, acquired, right [M20.61]    Discharge Diagnosis: same    Procedures Performed: REPAIR HAMMERTOE / MALLET TOE / CLAW TOE: 07151 (CPT®)  CAPSULECTOMY: 41759 (CPT®)    Complications: none    Condition at Discharge: stable    Discharge instructions/Information to patient and family:   See after visit summary for information provided to patient and family.      Provisions for Follow-Up Care/Important appointments:  See after visit summary for information related to follow-up care and any pertinent home health orders.      Discharge Medications:  See after visit summary for reconciled discharge medications provided to patient and family.

## 2024-02-06 NOTE — INTERVAL H&P NOTE
H&P reviewed. After examining the patient I find no changes in the patients condition since the H&P had been written.    Vitals:    02/06/24 0531   BP: 125/65   Pulse: 63   Resp: 20   Temp: (!) 97.4 °F (36.3 °C)   SpO2: 95%

## 2024-02-06 NOTE — OP NOTE
OPERATIVE REPORT  PATIENT NAME: Russ Bowen    :  1985  MRN: [id number]  Pt Location: [facility] OR ROOM 03    SURGERY DATE: 2024    Surgeons and Role:     * Dwain Zacarias DPM - Primary     * Nigel Saunders DPM - Assisting    Preop Diagnosis:  Toe deformity, acquired, right [M20.61]    Post-Op Diagnosis Codes:     * Toe deformity, acquired, right [M20.61]    Procedure(s):  Right - REPAIR HAMMERTOE / MALLET TOE / CLAW TOE  Right - CAPSULECTOMY  RIGHT-PLANTAR FASCIA INJECTION     Specimen(s):  ID Type Source Tests Collected by Time Destination   1 : Right 2nd Toe Mucoid Cyst Tissue Cyst TISSUE EXAM Dwain Zacarias DPM 2024 0750        Estimated Blood Loss:   Minimal    Drains:  * No LDAs found *    Anesthesia Type:   IV Sedation with Anesthesia    Operative Indications:  Toe deformity, acquired, right [M20.61]      Operative Findings:  Removal of right 2nd toe mucoid cyst  Arthroplasty of the right 2nd toe DIPJ  Plantar fascia injection right foot consisting of 0.5 cc of Kenalog 40, 1 cc of 1% lidocaine and 1 cc of 0.5% Marcaine    Complications:   None    Procedure and Technique:    Patient was brought back to the operating room under light sedation and transferred to the operating room table in the supine position.  After anesthesia was administered a preinjection timeout was completed with all parties in agreement.  A local block consisting of 5 cc of 1% lidocaine and 0.5% Marcaine in a one-to-one mix was injected to the right second toe as a digital block.  Injection consisting of 0.5 cc of Kenalog 40, 1 cc of 1% lidocaine and 1 cc of 0.5% Marcaine was injected to the right foot plantar fascia near its insertion into the calcaneus.  A tourniquet was then placed around the patient's right lower extremity with ample Webril padding.  The foot was then prepped and draped in the normal sterile manner.  A preincision timeout was completed with all parties in agreement.  The tourniquet was then inflated  to 250 mmHg    Attention was drawn to the right second toe.  An mucoid cyst was present at the distal aspect of the toe.  A 3-1 ellipse incision was made to excise the cyst and it was passed off the back table for pathological examination.  The incision was then copiously flushed utilizing sterile saline.  Closure of the skin was achieved utilizing 4-0 nylon in a simple suture type fashion.    Attention was then drawn to the right second toe.  A hammertoe deformity was noted at the DIPJ.  A 3-1 ellipse incision was made overlying the joint and utilizing a 15 blade the incision was made down to bone.  Soft tissue was reflected off of the head of the middle phalanx.  Utilizing a sagittal saw the head of the middle phalanx was resected and passed off the table.  By show push test the deformity appears to been resolved.  The incision was then copiously flushed utilizing sterile saline.  Skin closure was achieved utilizing 4-0 nylon in simple suture type fashion.  The tourniquet was then deflated for total time of 21 minutes.  A dressing consisting of Xeroform DSD was applied to the right foot.  Patient tolerated the procedure well with no immediate complications.    Dr. Zacarias was present for the entire procedure.    Patient Disposition:  PACU         SIGNATURE: Nigel Saunders DPM  DATE: February 6, 2024  TIME: 8:24 AM

## 2024-02-09 PROCEDURE — 88304 TISSUE EXAM BY PATHOLOGIST: CPT | Performed by: PATHOLOGY

## 2024-02-14 ENCOUNTER — OFFICE VISIT (OUTPATIENT)
Dept: PODIATRY | Facility: CLINIC | Age: 39
End: 2024-02-14

## 2024-02-14 VITALS — SYSTOLIC BLOOD PRESSURE: 139 MMHG | DIASTOLIC BLOOD PRESSURE: 81 MMHG | HEART RATE: 84 BPM

## 2024-02-14 DIAGNOSIS — M67.479 DIGITAL MUCINOUS CYST OF TOE: Primary | ICD-10-CM

## 2024-02-14 DIAGNOSIS — M20.61 TOE DEFORMITY, ACQUIRED, RIGHT: ICD-10-CM

## 2024-02-14 PROCEDURE — 99024 POSTOP FOLLOW-UP VISIT: CPT | Performed by: PODIATRIST

## 2024-02-14 NOTE — PROGRESS NOTES
Patient presents 5 days post excision mucoid cyst right second toe and Adal phalangectomy right second toe.  Each incision is healing unremarkably with no evidence of infection or wound dehiscence.  Minimal discomfort related.  Patient to continue with current orders.  Reappoint 8 days for suture removal.

## 2024-02-21 PROBLEM — Z11.3 SCREEN FOR STD (SEXUALLY TRANSMITTED DISEASE): Status: RESOLVED | Noted: 2020-10-19 | Resolved: 2024-02-21

## 2024-02-22 ENCOUNTER — OFFICE VISIT (OUTPATIENT)
Dept: PODIATRY | Facility: CLINIC | Age: 39
End: 2024-02-22

## 2024-02-22 VITALS
RESPIRATION RATE: 18 BRPM | HEIGHT: 75 IN | HEART RATE: 102 BPM | SYSTOLIC BLOOD PRESSURE: 125 MMHG | DIASTOLIC BLOOD PRESSURE: 85 MMHG | BODY MASS INDEX: 39.17 KG/M2 | WEIGHT: 315 LBS

## 2024-02-22 DIAGNOSIS — M20.61 TOE DEFORMITY, ACQUIRED, RIGHT: ICD-10-CM

## 2024-02-22 DIAGNOSIS — M67.479 DIGITAL MUCINOUS CYST OF TOE: Primary | ICD-10-CM

## 2024-02-22 PROCEDURE — 99024 POSTOP FOLLOW-UP VISIT: CPT | Performed by: PODIATRIST

## 2024-02-22 NOTE — PROGRESS NOTES
Patient presents 13 days post mucoid cyst excision right second toe with resection head of middle phalanx right second toe.  Surgical site healing with no infection and no edema.  All sutures removed.  Patient may get foot wet.  He should stay in surgical shoe for another 8 days and then may return to a comfortable shoe.  He is rescheduled in 5 weeks.  He will contact me should he have difficulty in the interim.

## 2024-03-06 ENCOUNTER — OFFICE VISIT (OUTPATIENT)
Dept: BARIATRICS | Facility: CLINIC | Age: 39
End: 2024-03-06
Payer: COMMERCIAL

## 2024-03-06 VITALS
WEIGHT: 315 LBS | HEART RATE: 86 BPM | SYSTOLIC BLOOD PRESSURE: 126 MMHG | RESPIRATION RATE: 16 BRPM | HEIGHT: 73 IN | DIASTOLIC BLOOD PRESSURE: 70 MMHG | BODY MASS INDEX: 41.75 KG/M2 | TEMPERATURE: 97.6 F

## 2024-03-06 DIAGNOSIS — G47.33 OBSTRUCTIVE SLEEP APNEA TREATED WITH CONTINUOUS POSITIVE AIRWAY PRESSURE (CPAP): ICD-10-CM

## 2024-03-06 DIAGNOSIS — E66.01 CLASS 3 SEVERE OBESITY DUE TO EXCESS CALORIES WITH SERIOUS COMORBIDITY AND BODY MASS INDEX (BMI) OF 45.0 TO 49.9 IN ADULT (HCC): Primary | ICD-10-CM

## 2024-03-06 DIAGNOSIS — Z13.6 SCREENING FOR CARDIOVASCULAR CONDITION: ICD-10-CM

## 2024-03-06 PROBLEM — E66.813 CLASS 3 SEVERE OBESITY DUE TO EXCESS CALORIES WITHOUT SERIOUS COMORBIDITY WITH BODY MASS INDEX (BMI) OF 45.0 TO 49.9 IN ADULT (HCC): Status: ACTIVE | Noted: 2019-07-24

## 2024-03-06 PROCEDURE — 99244 OFF/OP CNSLTJ NEW/EST MOD 40: CPT | Performed by: INTERNAL MEDICINE

## 2024-03-06 NOTE — PATIENT INSTRUCTIONS
General Recommendations:  Nutrition:  Eat breakfast daily.  Do not skip meals.     Food log (ie.) www.KeepTrax.com, sparkpeople.com, loseit.com, calorieking.com, etc.    Practice mindful eating.  Be sure to set aside time to eat, eat slowly, and savor your food.    Hydration:    At least 64oz of water daily.  No sugar sweetened beverages.  No juice (eat the fruit instead).    Exercise:  Studies have shown that the ideal exercise goal is somewhere between 150 to 300 minutes of moderate intensity exercise a week.  Start with exercising 10 minutes every other day and gradually increase physical activity with a goal of at least 150 minutes of moderate intensity exercise a week, divided over at least 3 days a week.  An example of this would be exercising 30 minutes a day, 5 days a week.  Resistance training can increase muscle mass and increase our resting metabolic rate.   FULL BODY resistance training is recommended 2-3 times a week.  Do not do this on consecutive days to allow for muscle recovery.    Aim for a bare minimum 5000 steps, even on days you do not exercise.    Monitoring:   Weigh yourself daily.  If this causes undue stress, then just weigh yourself once a week.  Weigh yourself the same time of the day with the same amount of clothing on.  Preferably this should be done after waking up, before you eat, and with no clothing or minimal clothing on.    Specific Goals:  Food log (ie.) www.KeepTrax.com,sparkpeople.com,loseit.com,calorieking.com,etc.   No sugary beverages. At least 64oz of water daily.  Increase physical activity by 10 minutes daily    Calorie goal:  3353-3284 calories a day (Provided with meal plan to follow).    Meet with  to discuss impulsive behaviors around eating.    Meet with dietician.    Return visit:  3 month

## 2024-03-06 NOTE — PROGRESS NOTES
Assessment/Plan:  Russ was seen today for consult.    Diagnoses and all orders for this visit:    Class 3 severe obesity due to excess calories with serious comorbidity and body mass index (BMI) of 45.0 to 49.9 in adult (HCC)  -     Ambulatory Referral to Weight Management  -     Comprehensive metabolic panel; Future  -     Lipid panel; Future  -     TSH, 3rd generation with Free T4 reflex; Future    Obstructive sleep apnea treated with continuous positive airway pressure (CPAP)    Screening for cardiovascular condition  -     Comprehensive metabolic panel; Future  -     Lipid panel; Future       - Discussed options of HealthyCORE-Intensive Lifestyle Intervention Program, Very Low Calorie Diet-VLCD, Conservative Program, Kandice-En-Y Gastric Bypass, and Vertical Sleeve Gastrectomy and the role of weight loss medications.  - Explained the importance of making lifestyle changes first before starting anti-obesity medications.  - Patient should demonstrate lifestyle changes first before anti-obesity medication initiated.   - Patient is interested in pursuing Conservative Program  - Initial weight loss goal of 5-10% weight loss for improved health as studies have shown this is where we see the greatest impact on improving health and decreasing risk of obesity related conditions.  - Weight loss can improve patient's co-morbid conditions and/or prevent weight-related complications.  - Labs reviewed: As below.   Additional labs ordered as TSH has not been checked.    General Recommendations:  Nutrition:  Eat breakfast daily.  Do not skip meals.     Food log (ie.) www.UTStarcom.com, sparkpeople.com, loseit.com, calorieking.com, etc.    Practice mindful eating.  Be sure to set aside time to eat, eat slowly, and savor your food.    Hydration:    At least 64oz of water daily.  No sugar sweetened beverages.  No juice (eat the fruit instead).    Exercise:  Studies have shown that the ideal exercise goal is somewhere between 150  to 300 minutes of moderate intensity exercise a week.  Start with exercising 10 minutes every other day and gradually increase physical activity with a goal of at least 150 minutes of moderate intensity exercise a week, divided over at least 3 days a week.  An example of this would be exercising 30 minutes a day, 5 days a week.  Resistance training can increase muscle mass and increase our resting metabolic rate.   FULL BODY resistance training is recommended 2-3 times a week.  Do not do this on consecutive days to allow for muscle recovery.    Aim for a bare minimum 5000 steps, even on days you do not exercise.    Monitoring:   Weigh yourself daily.  If this causes undue stress, then just weigh yourself once a week.  Weigh yourself the same time of the day with the same amount of clothing on.  Preferably this should be done after waking up, before you eat, and with no clothing or minimal clothing on.    Specific Goals:  Food log (ie.) www.[employer].com,sparkpeople.com,loseit.com,calorieking.com,etc.   No sugary beverages. At least 64oz of water daily.  Increase physical activity by 10 minutes daily    Calorie goal:  3467-8642 calories a day (Provided with meal plan to follow).    Meet with  to discuss impulsive behaviors around eating.    Meet with dietician.    Return visit:  3 month     Total time spent reviewing chart, interviewing patient, examining patient, discussing plan, answering all questions, and documentin min.       ______________________________________________________________________    Subjective:   Chief Complaint   Patient presents with    Consult     MWM Consult;Gw-250lb;Pt has sleep apnea     HPI: Russ Bowen  is a 38 y.o. male with history of KENDRA on CPAP and excess weight, here to pursue weight loss management.  Previous notes and records have been reviewed.    HPI  Wt Readings from Last 20 Encounters:   24 (!) 156 kg (344 lb 6.4 oz)   24 (!) 154 kg (340 lb)  "  02/06/24 (!) 162 kg (356 lb 11.3 oz)   01/23/24 (!) 161 kg (355 lb)   01/10/24 (!) 160 kg (352 lb)   11/03/23 (!) 160 kg (353 lb)   03/16/23 (!) 156 kg (344 lb)   04/27/22 (!) 150 kg (330 lb)   03/03/22 (!) 150 kg (330 lb)   01/13/22 (!) 153 kg (338 lb)   04/21/21 (!) 148 kg (327 lb)   03/02/21 (!) 149 kg (327 lb 6.4 oz)   01/15/21 (!) 150 kg (330 lb)   10/19/20 (!) 144 kg (317 lb)   04/16/20 (!) 143 kg (315 lb)   03/02/20 (!) 143 kg (315 lb 12.8 oz)   02/25/20 (!) 141 kg (310 lb 9.6 oz)   01/17/20 (!) 140 kg (309 lb 3.2 oz)   09/17/18 (!) 158 kg (348 lb)   11/14/17 (!) 161 kg (353 lb 15.9 oz)     Excess Weight:  Onset: childhood (grduated HS at 250-260)  Highest weight: 370  Current weight: 344  What has been tried: Diet and Exercise  Partial meal replacement (all but one meal)   Ketogenic diet for one year (went from 340 to 260)  Calorie tracking to track carbs    Contributing factors: ADHD.  Impulse eating.  Dx with IBS.  Intense hunger when he does not eat.  Associated symptoms and effects:     Hunger/Cravings: sweet and salt  Dining out:  50:50   Hydration:  Water  80-120oz    Unsweatened ice tea with one packet of noncaloric sweetener    Occasional diet soda  Alcohol:  No  Smoking: Medical marijuana daily (reports he smokes strains that do not make him hungry)  Exercise: No  Weight Monitoring:  No  Sleep:  6 hour  STOP-BANG Score:  KENDRA on CPAP  Occupation:  [employer] \"[employer]\"    Past Medical History:   Diagnosis Date    Anxiety     CPAP (continuous positive airway pressure) dependence     Epididymitis     Hypertension     Obesity     Orchalgia     Renal failure     10 yrs ago resolved    Sleep apnea      Patient denies personal and family history of pancreatitis, thyroid cancer, MEN-2 tumors.  Denies any hx of glaucoma, seizures, kidney stones, gallstones.  Denies Hx of CAD, PAD, palpitations, arrhythmia.   Denies uncontrolled anxiety or depression, suicidal behavior or thinking , insomnia or sleep " "disturbance.     Past Surgical History:   Procedure Laterality Date    COLONOSCOPY      [facility] CORRECTION HAMMERTOE Right 2/6/2024    Procedure: REPAIR HAMMERTOE / MALLET TOE / CLAW TOE;  Surgeon: Dwain Zacarias DPM;  Location: [facility];  Service: Podiatry    [facility] EXC LESION TENDON SHEATH/CAPSULE W/SYNVCT FOOT Right 2/6/2024    Procedure: CAPSULECTOMY;  Surgeon: Dwain Zacarias DPM;  Location: [facility];  Service: Podiatry    VASECTOMY  11/14/2017     The following portions of the patient's history were reviewed and updated as appropriate: allergies, current medications, past family history, past medical history, past social history, past surgical history, and problem list.    Review Of Systems:  Review of Systems   Constitutional:  Negative for activity change, appetite change, chills, fatigue and fever.   HENT:  Negative for trouble swallowing.    Respiratory:  Negative for cough and shortness of breath.    Cardiovascular:  Negative for chest pain, palpitations and leg swelling.   Gastrointestinal:  Negative for abdominal pain, constipation, diarrhea, nausea and vomiting.   Endocrine: Negative for cold intolerance and heat intolerance.   Genitourinary:  Negative for difficulty urinating and dysuria.   Musculoskeletal:  Negative for arthralgias, back pain, gait problem and myalgias.   Skin:  Negative for pallor and rash.   Neurological:  Negative for headaches.   Psychiatric/Behavioral:  Negative for dysphoric mood and suicidal ideas. The patient is not nervous/anxious.        Objective:  /70   Pulse 86   Temp 97.6 °F (36.4 °C)   Resp 16   Ht 6' 1\" (1.854 m)   Wt (!) 156 kg (344 lb 6.4 oz)   BMI 45.44 kg/m²   Physical Exam  Vitals and nursing note reviewed.   Constitutional:       General: He is not in acute distress.     Appearance: Normal appearance. He is not ill-appearing or diaphoretic.   Eyes:      General: No scleral icterus.  Cardiovascular:      Rate and Rhythm: Normal rate and regular rhythm.    "   Pulses: Normal pulses.      Heart sounds: Normal heart sounds. No murmur heard.  Pulmonary:      Effort: Pulmonary effort is normal. No respiratory distress.      Breath sounds: Normal breath sounds. No stridor. No wheezing or rhonchi.   Abdominal:      General: Bowel sounds are normal. There is no distension.      Palpations: Abdomen is soft. There is no mass.      Tenderness: There is no abdominal tenderness.   Musculoskeletal:      Cervical back: Neck supple.      Right lower leg: No edema.      Left lower leg: No edema.   Lymphadenopathy:      Cervical: No cervical adenopathy.   Skin:     Capillary Refill: Capillary refill takes less than 2 seconds.      Findings: No lesion or rash.   Neurological:      Mental Status: He is alert and oriented to person, place, and time.      Gait: Gait normal.   Psychiatric:         Mood and Affect: Mood normal.         Behavior: Behavior normal.         Labs and Imaging  Recent labs and imaging have been personally reviewed.  Lab Results   Component Value Date    WBC 6.51 01/22/2024    HGB 15.7 01/22/2024    HCT 49.0 01/22/2024    MCV 85 01/22/2024     01/22/2024     Lab Results   Component Value Date    SODIUM 139 01/22/2024    K 4.4 01/22/2024     01/22/2024    CO2 28 01/22/2024    AGAP 5 01/22/2024    BUN 13 01/22/2024    CREATININE 1.03 01/22/2024    GLUC 90 01/22/2024    CALCIUM 9.0 01/22/2024    AST 13 06/12/2019    ALT 35 06/12/2019    ALKPHOS 62 06/12/2019    TP 7.3 06/12/2019    TBILI 1.0 06/12/2019    EGFR 93 01/22/2024

## 2024-03-11 DIAGNOSIS — F90.9 ATTENTION DEFICIT HYPERACTIVITY DISORDER (ADHD), UNSPECIFIED ADHD TYPE: ICD-10-CM

## 2024-03-11 RX ORDER — LISDEXAMFETAMINE DIMESYLATE CAPSULES 20 MG/1
20 CAPSULE ORAL EVERY MORNING
Qty: 30 CAPSULE | Refills: 0 | Status: SHIPPED | OUTPATIENT
Start: 2024-03-11

## 2024-05-14 ENCOUNTER — OFFICE VISIT (OUTPATIENT)
Dept: FAMILY MEDICINE CLINIC | Facility: CLINIC | Age: 39
End: 2024-05-14
Payer: COMMERCIAL

## 2024-05-14 VITALS
WEIGHT: 315 LBS | DIASTOLIC BLOOD PRESSURE: 68 MMHG | HEART RATE: 105 BPM | SYSTOLIC BLOOD PRESSURE: 136 MMHG | OXYGEN SATURATION: 97 % | HEIGHT: 73 IN | BODY MASS INDEX: 41.75 KG/M2 | TEMPERATURE: 97.6 F

## 2024-05-14 DIAGNOSIS — F41.1 GAD (GENERALIZED ANXIETY DISORDER): Primary | ICD-10-CM

## 2024-05-14 PROCEDURE — 99214 OFFICE O/P EST MOD 30 MIN: CPT | Performed by: FAMILY MEDICINE

## 2024-05-14 RX ORDER — CITALOPRAM HYDROBROMIDE 10 MG/1
10 TABLET ORAL DAILY
Qty: 30 TABLET | Refills: 2 | Status: SHIPPED | OUTPATIENT
Start: 2024-05-14

## 2024-05-14 NOTE — PROGRESS NOTES
Assessment/Plan:   1. SKYE (generalized anxiety disorder)  Reviewed patient symptoms today.  At this time, symptoms appear secondary to generalized anxiety disorder.  At this time, he was advised that this could be caused by multiple stressors.  He may highly benefit from speaking with a therapist regularly.  He does see a talk therapist.  Continue with his current treatment.  He may highly benefit from exercising regularly.  Reviewed medical treatment options with him.  At this time, we will start treatment of citalopram 10 mg daily.  Will increase dose if needed.  - citalopram (CeleXA) 10 mg tablet; Take 1 tablet (10 mg total) by mouth daily  Dispense: 30 tablet; Refill: 2           Diagnoses and all orders for this visit:    SKYE (generalized anxiety disorder)          Subjective:       Chief Complaint   Patient presents with    Anxiety      Patient ID: Russ Bowen is a 39 y.o. male presents today with CC of anxiety.  He has had this problem for the past few months.  Symptoms started gradually.  He states that he has been having multiple stressors.  He states that he did lose his father over the past few months.  He has been noticing difficulty with anxiety.  He has been having racing thoughts.  Low energy.  Poor focus/concentration.  He has been noticing more irritability.  He has been having difficulty sleeping at nighttime.  He has not taken anything for symptom relief.    Anxiety  Patient reports no chest pain, dizziness, nausea, palpitations or shortness of breath.         Review of Systems   Constitutional:  Negative for activity change, chills, fatigue and fever.   HENT:  Negative for congestion, ear pain, sinus pressure and sore throat.    Eyes:  Negative for redness, itching and visual disturbance.   Respiratory:  Negative for cough and shortness of breath.    Cardiovascular:  Negative for chest pain and palpitations.   Gastrointestinal:  Negative for abdominal pain, diarrhea and nausea.   Endocrine:  "Negative for cold intolerance and heat intolerance.   Genitourinary:  Negative for dysuria, flank pain and frequency.   Musculoskeletal:  Negative for arthralgias, back pain, gait problem and myalgias.   Skin:  Negative for color change.   Allergic/Immunologic: Negative for environmental allergies.   Neurological:  Negative for dizziness, numbness and headaches.   Psychiatric/Behavioral:  Negative for behavioral problems and sleep disturbance.        The following portions of the patient's history were reviewed and updated as appropriate : past family history, past medical history, past social history and past surgical history.    Current Outpatient Medications:     lisdexamfetamine (VYVANSE) 20 MG capsule, Take 1 capsule (20 mg total) by mouth every morning Max Daily Amount: 20 mg, Disp: 30 capsule, Rfl: 0         Objective:         Vitals:    05/14/24 1502   BP: 136/68   BP Location: Left arm   Patient Position: Sitting   Cuff Size: Large   Pulse: 105   Temp: 97.6 °F (36.4 °C)   TempSrc: Temporal   SpO2: 97%   Weight: (!) 155 kg (342 lb 6.4 oz)   Height: 6' 1\" (1.854 m)     Physical Exam  Vitals reviewed.   Constitutional:       General: He is not in acute distress.     Appearance: Normal appearance. He is well-developed.   HENT:      Head: Normocephalic and atraumatic.      Right Ear: Tympanic membrane, ear canal and external ear normal. There is no impacted cerumen.      Left Ear: Tympanic membrane, ear canal and external ear normal. There is no impacted cerumen.      Nose: Nose normal. No congestion or rhinorrhea.      Mouth/Throat:      Mouth: Mucous membranes are moist.      Pharynx: No oropharyngeal exudate or posterior oropharyngeal erythema.   Eyes:      General: No scleral icterus.        Right eye: No discharge.         Left eye: No discharge.      Extraocular Movements: Extraocular movements intact.      Conjunctiva/sclera: Conjunctivae normal.      Pupils: Pupils are equal, round, and reactive to " light.   Neck:      Trachea: No tracheal deviation.   Cardiovascular:      Rate and Rhythm: Normal rate and regular rhythm.      Pulses: Normal pulses.           Dorsalis pedis pulses are 2+ on the right side and 2+ on the left side.        Posterior tibial pulses are 2+ on the right side and 2+ on the left side.      Heart sounds: Normal heart sounds. No murmur heard.     No friction rub. No gallop.   Pulmonary:      Effort: Pulmonary effort is normal. No respiratory distress.      Breath sounds: Normal breath sounds. No wheezing, rhonchi or rales.   Abdominal:      General: Bowel sounds are normal. There is no distension.      Palpations: Abdomen is soft.      Tenderness: There is no abdominal tenderness. There is no guarding or rebound.   Musculoskeletal:         General: Normal range of motion.      Cervical back: Normal range of motion and neck supple.      Right lower leg: No edema.      Left lower leg: No edema.   Lymphadenopathy:      Head:      Right side of head: No submental or submandibular adenopathy.      Left side of head: No submental or submandibular adenopathy.      Cervical: No cervical adenopathy.      Right cervical: No superficial, deep or posterior cervical adenopathy.     Left cervical: No superficial, deep or posterior cervical adenopathy.   Skin:     General: Skin is warm and dry.      Findings: No erythema.   Neurological:      General: No focal deficit present.      Mental Status: He is alert and oriented to person, place, and time.      Cranial Nerves: No cranial nerve deficit.      Sensory: Sensation is intact. No sensory deficit.      Motor: Motor function is intact.   Psychiatric:         Attention and Perception: Attention and perception normal.         Mood and Affect: Mood is not anxious or depressed.         Speech: Speech normal.         Behavior: Behavior normal.         Thought Content: Thought content normal.         Judgment: Judgment normal.

## 2024-07-02 ENCOUNTER — APPOINTMENT (OUTPATIENT)
Dept: LAB | Facility: CLINIC | Age: 39
End: 2024-07-02
Payer: COMMERCIAL

## 2024-07-02 DIAGNOSIS — E66.01 CLASS 3 SEVERE OBESITY DUE TO EXCESS CALORIES WITH SERIOUS COMORBIDITY AND BODY MASS INDEX (BMI) OF 45.0 TO 49.9 IN ADULT (HCC): ICD-10-CM

## 2024-07-02 DIAGNOSIS — Z13.6 SCREENING FOR CARDIOVASCULAR CONDITION: ICD-10-CM

## 2024-07-02 LAB — TSH SERPL DL<=0.05 MIU/L-ACNC: 0.87 UIU/ML (ref 0.45–4.5)

## 2024-07-02 PROCEDURE — 84443 ASSAY THYROID STIM HORMONE: CPT

## 2024-07-02 PROCEDURE — 80061 LIPID PANEL: CPT

## 2024-07-02 PROCEDURE — 80053 COMPREHEN METABOLIC PANEL: CPT

## 2024-07-02 PROCEDURE — 36415 COLL VENOUS BLD VENIPUNCTURE: CPT

## 2024-07-03 LAB
ALBUMIN SERPL BCG-MCNC: 4.3 G/DL (ref 3.5–5)
ALP SERPL-CCNC: 53 U/L (ref 34–104)
ALT SERPL W P-5'-P-CCNC: 23 U/L (ref 7–52)
ANION GAP SERPL CALCULATED.3IONS-SCNC: 8 MMOL/L (ref 4–13)
AST SERPL W P-5'-P-CCNC: 19 U/L (ref 13–39)
BILIRUB SERPL-MCNC: 0.76 MG/DL (ref 0.2–1)
BUN SERPL-MCNC: 16 MG/DL (ref 5–25)
CALCIUM SERPL-MCNC: 9.2 MG/DL (ref 8.4–10.2)
CHLORIDE SERPL-SCNC: 104 MMOL/L (ref 96–108)
CHOLEST SERPL-MCNC: 154 MG/DL
CO2 SERPL-SCNC: 28 MMOL/L (ref 21–32)
CREAT SERPL-MCNC: 0.96 MG/DL (ref 0.6–1.3)
GFR SERPL CREATININE-BSD FRML MDRD: 100 ML/MIN/1.73SQ M
GLUCOSE P FAST SERPL-MCNC: 85 MG/DL (ref 65–99)
HDLC SERPL-MCNC: 46 MG/DL
LDLC SERPL CALC-MCNC: 93 MG/DL (ref 0–100)
NONHDLC SERPL-MCNC: 108 MG/DL
POTASSIUM SERPL-SCNC: 4.5 MMOL/L (ref 3.5–5.3)
PROT SERPL-MCNC: 6.6 G/DL (ref 6.4–8.4)
SODIUM SERPL-SCNC: 140 MMOL/L (ref 135–147)
TRIGL SERPL-MCNC: 76 MG/DL

## 2024-07-09 ENCOUNTER — TELEPHONE (OUTPATIENT)
Dept: BARIATRICS | Facility: CLINIC | Age: 39
End: 2024-07-09

## 2024-07-09 ENCOUNTER — OFFICE VISIT (OUTPATIENT)
Dept: BARIATRICS | Facility: CLINIC | Age: 39
End: 2024-07-09
Payer: COMMERCIAL

## 2024-07-09 VITALS
HEIGHT: 73 IN | HEART RATE: 78 BPM | BODY MASS INDEX: 41.75 KG/M2 | RESPIRATION RATE: 16 BRPM | WEIGHT: 315 LBS | SYSTOLIC BLOOD PRESSURE: 118 MMHG | TEMPERATURE: 98.5 F | DIASTOLIC BLOOD PRESSURE: 78 MMHG

## 2024-07-09 DIAGNOSIS — G47.33 OBSTRUCTIVE SLEEP APNEA TREATED WITH CONTINUOUS POSITIVE AIRWAY PRESSURE (CPAP): ICD-10-CM

## 2024-07-09 DIAGNOSIS — E66.01 CLASS 3 SEVERE OBESITY DUE TO EXCESS CALORIES WITHOUT SERIOUS COMORBIDITY WITH BODY MASS INDEX (BMI) OF 45.0 TO 49.9 IN ADULT (HCC): Primary | ICD-10-CM

## 2024-07-09 PROCEDURE — 99214 OFFICE O/P EST MOD 30 MIN: CPT | Performed by: INTERNAL MEDICINE

## 2024-07-09 NOTE — PATIENT INSTRUCTIONS
General Recommendations:  Nutrition:  Eat breakfast daily.  Do not skip meals.     Food log (ie.) www.Results Scorecard.com, sparkpeople.com, loseit.com, calorieking.com, etc.    Practice mindful eating.  Be sure to set aside time to eat, eat slowly, and savor your food.    Hydration:    At least 64oz of water daily.  No sugar sweetened beverages.  No juice (eat the fruit instead).    Exercise:  Studies have shown that the ideal exercise goal is somewhere between 150 to 300 minutes of moderate intensity exercise a week.  Start with exercising 10 minutes every other day and gradually increase physical activity with a goal of at least 150 minutes of moderate intensity exercise a week, divided over at least 3 days a week.  An example of this would be exercising 30 minutes a day, 5 days a week.  Resistance training can increase muscle mass and increase our resting metabolic rate.   FULL BODY resistance training is recommended 2-3 times a week.  Do not do this on consecutive days to allow for muscle recovery.    Aim for a bare minimum 5000 steps, even on days you do not exercise.    Monitoring:   Weigh yourself daily.  If this causes undue stress, then just weigh yourself once a week.  Weigh yourself the same time of the day with the same amount of clothing on.  Preferably this should be done after waking up, before you eat, and with no clothing or minimal clothing on.    Specific Goals:  Discuss potentially switching from citalopram to fluoxetine with your primary care doctor as this is more metabolically favorable.    2.   Start Wegovy 0.25mg weekly.  Visit wegovy.com for further information/injection instructions.   Please eat small frequent meals to help reduce nausea. Lemon water and saltine crackers may help with this.  Monitor your resting heart rate and contact the office if it is greater than 100.  If you experience fever, nausea/vomiting, and pain radiating to your back this may be a sign of pancreatitis. Please go  to the emergency room if this occurs.      3.  Nurse visit in 1 month    4.  Meet with dietitian

## 2024-07-09 NOTE — PROGRESS NOTES
Assessment/Plan:  Russ was seen today for follow-up.    Diagnoses and all orders for this visit:    Class 3 severe obesity due to excess calories without serious comorbidity with body mass index (BMI) of 45.0 to 49.9 in adult ([facility])  -     Semaglutide-Weight Management (WEGOVY) 0.25 MG/0.5ML; Inject 0.5 mL (0.25 mg total) under the skin once a week for 28 days    Obstructive sleep apnea treated with continuous positive airway pressure (CPAP)       General Recommendations:  Nutrition:  Eat breakfast daily.  Do not skip meals.     Food log (ie.) [email or web address], [email or web address], [email or web address], [email or web address], etc.    Practice mindful eating.  Be sure to set aside time to eat, eat slowly, and savor your food.    Hydration:    At least 64oz of water daily.  No sugar sweetened beverages.  No juice (eat the fruit instead).    Exercise:  Studies have shown that the ideal exercise goal is somewhere between 150 to 300 minutes of moderate intensity exercise a week.  Start with exercising 10 minutes every other day and gradually increase physical activity with a goal of at least 150 minutes of moderate intensity exercise a week, divided over at least 3 days a week.  An example of this would be exercising 30 minutes a day, 5 days a week.  Resistance training can increase muscle mass and increase our resting metabolic rate.   FULL BODY resistance training is recommended 2-3 times a week.  Do not do this on consecutive days to allow for muscle recovery.    Aim for a bare minimum 5000 steps, even on days you do not exercise.    Monitoring:   Weigh yourself daily.  If this causes undue stress, then just weigh yourself once a week.  Weigh yourself the same time of the day with the same amount of clothing on.  Preferably this should be done after waking up, before you eat, and with no clothing or minimal clothing on.    Specific Goals:  Discuss potentially switching from citalopram to fluoxetine with your primary care doctor as  this is more metabolically favorable.    2.   Start Wegovy 0.25mg weekly.  Visit [email or web address] for further information/injection instructions.   Please eat small frequent meals to help reduce nausea. Lemon water and saltine crackers may help with this.  Monitor your resting heart rate and contact the office if it is greater than 100.  If you experience fever, nausea/vomiting, and pain radiating to your back this may be a sign of pancreatitis. Please go to the emergency room if this occurs.      3.  Nurse visit in 1 month    4.  Meet with dietitian      Total time spent reviewing chart, interviewing patient, examining patient, discussing plan, answering all questions, and documentin min.       ______________________________________________________________________        Subjective:   Chief Complaint   Patient presents with    Follow-up     MWM 4M F/u; Waist 56in     Patient here to discuss weight associated problems and nutrition goals  HPI: Russ Bowen  is a 39 y.o. male with history of KENDRA on CPAP, ADHD, and excess weight.  Weight loss plan:  Conservative Program.   Most recent notes and records were reviewed.    Wt Readings from Last 10 Encounters:   24 (!) 156 kg (343 lb 12.8 oz)   24 (!) 155 kg (342 lb 6.4 oz)   24 (!) 156 kg (344 lb 6.4 oz)   24 (!) 154 kg (340 lb)   24 (!) 162 kg (356 lb 11.3 oz)   24 (!) 161 kg (355 lb)   01/10/24 (!) 160 kg (352 lb)   23 (!) 160 kg (353 lb)   23 (!) 156 kg (344 lb)   22 (!) 150 kg (330 lb)     Highest weight: 370  Initial weight: 344  Last OV weight: 344  Current weight: 343  Change in weight: -1 pounds      Patient denies personal and family history of  pancreatitis, thyroid cancer, MEN-2 tumors.    Review Of Systems:  Review of Systems   Constitutional:  Negative for activity change, appetite change, chills, fatigue and fever.   HENT:  Negative for trouble swallowing.    Respiratory:  Negative for cough and  "shortness of breath.    Cardiovascular:  Negative for chest pain, palpitations and leg swelling.   Gastrointestinal:  Negative for abdominal pain, constipation, diarrhea, nausea and vomiting.   Endocrine: Negative for cold intolerance and heat intolerance.   Genitourinary:  Negative for difficulty urinating and dysuria.   Musculoskeletal:  Negative for arthralgias, back pain, gait problem and myalgias.   Skin:  Negative for pallor and rash.   Neurological:  Negative for headaches.   Psychiatric/Behavioral:  Negative for dysphoric mood and suicidal ideas. The patient is not nervous/anxious.        Objective:  /78   Pulse 78   Temp 98.5 °F (36.9 °C)   Resp 16   Ht 6' 1\" (1.854 m)   Wt (!) 156 kg (343 lb 12.8 oz)   BMI 45.36 kg/m²   Physical Exam  Vitals and nursing note reviewed.   Constitutional:       General: He is not in acute distress.     Appearance: Normal appearance. He is not ill-appearing or diaphoretic.   Eyes:      General: No scleral icterus.  Cardiovascular:      Rate and Rhythm: Normal rate and regular rhythm.      Pulses: Normal pulses.      Heart sounds: Normal heart sounds. No murmur heard.  Pulmonary:      Effort: Pulmonary effort is normal. No respiratory distress.      Breath sounds: Normal breath sounds. No stridor. No wheezing or rhonchi.   Abdominal:      General: Bowel sounds are normal. There is no distension.      Palpations: Abdomen is soft. There is no mass.      Tenderness: There is no abdominal tenderness.   Musculoskeletal:      Cervical back: Neck supple.      Right lower leg: No edema.      Left lower leg: No edema.   Lymphadenopathy:      Cervical: No cervical adenopathy.   Skin:     Capillary Refill: Capillary refill takes less than 2 seconds.      Findings: No lesion or rash.   Neurological:      Mental Status: He is alert and oriented to person, place, and time.      Gait: Gait normal.   Psychiatric:         Mood and Affect: Mood normal.         Behavior: Behavior " "normal.         Labs and Imaging  Recent labs and imaging have been personally reviewed.  Lab Results   Component Value Date    WBC 6.51 01/22/2024    HGB 15.7 01/22/2024    HCT 49.0 01/22/2024    MCV 85 01/22/2024     01/22/2024     Lab Results   Component Value Date    SODIUM 140 07/02/2024    K 4.5 07/02/2024     07/02/2024    CO2 28 07/02/2024    AGAP 8 07/02/2024    BUN 16 07/02/2024    CREATININE 0.96 07/02/2024    GLUC 90 01/22/2024    GLUF 85 07/02/2024    CALCIUM 9.2 07/02/2024    AST 19 07/02/2024    ALT 23 07/02/2024    ALKPHOS 53 07/02/2024    TP 6.6 07/02/2024    TBILI 0.76 07/02/2024    EGFR 100 07/02/2024     No results found for: \"HGBA1C\"  Lab Results   Component Value Date    ECQ2SJJHNTPV 0.866 07/02/2024     Lab Results   Component Value Date    CHOLESTEROL 154 07/02/2024     Lab Results   Component Value Date    HDL 46 07/02/2024     Lab Results   Component Value Date    TRIG 76 07/02/2024     Lab Results   Component Value Date    LDLCALC 93 07/02/2024      "

## 2024-07-09 NOTE — TELEPHONE ENCOUNTER
PA for Wegovy 0.25 mg    Submitted via    [x][id number]  []Surescripts-Case ID #    []Faxed to plan   []Other website    []Phone call Case ID #      Office notes sent, clinical questions answered. Awaiting determination    Turnaround time for your insurance to make a decision on your Prior Authorization can take 7-21 business days.

## 2024-07-11 ENCOUNTER — TELEPHONE (OUTPATIENT)
Dept: BARIATRICS | Facility: CLINIC | Age: 39
End: 2024-07-11

## 2024-07-11 NOTE — TELEPHONE ENCOUNTER
PA for Wegovy 0.25 mg EXCLUDED from plan       Reason:(Screenshot if applicable)    Forms scanned from insurance    Message sent to office clinical pool Yes    Informed patient as well.

## 2024-08-06 DIAGNOSIS — F41.1 GAD (GENERALIZED ANXIETY DISORDER): ICD-10-CM

## 2024-08-06 RX ORDER — CITALOPRAM HYDROBROMIDE 10 MG/1
10 TABLET ORAL DAILY
Qty: 30 TABLET | Refills: 2 | Status: SHIPPED | OUTPATIENT
Start: 2024-08-06

## 2024-08-13 ENCOUNTER — TELEPHONE (OUTPATIENT)
Dept: BARIATRICS | Facility: CLINIC | Age: 39
End: 2024-08-13

## 2024-08-23 DIAGNOSIS — F41.1 GAD (GENERALIZED ANXIETY DISORDER): Primary | ICD-10-CM

## 2024-08-23 RX ORDER — FLUOXETINE 10 MG/1
10 CAPSULE ORAL DAILY
Qty: 30 CAPSULE | Refills: 1 | Status: SHIPPED | OUTPATIENT
Start: 2024-08-23

## 2024-10-21 DIAGNOSIS — F41.1 GAD (GENERALIZED ANXIETY DISORDER): ICD-10-CM

## 2024-10-23 RX ORDER — FLUOXETINE 10 MG/1
10 CAPSULE ORAL DAILY
Qty: 30 CAPSULE | Refills: 2 | Status: SHIPPED | OUTPATIENT
Start: 2024-10-23

## (undated) DEVICE — K-WIRE TROCAR POINT BOTH ENDS .045                                    X 4: Type: IMPLANTABLE DEVICE | Status: NON-FUNCTIONAL

## (undated) DEVICE — STOCKINETTE REGULAR

## (undated) DEVICE — POV-IOD SOLUTION 4OZ BT

## (undated) DEVICE — NEEDLE 25G X 1 1/2

## (undated) DEVICE — SYRINGE 5ML LL

## (undated) DEVICE — CUFF TOURNIQUET 18 X 4 IN QUICK CONNECT DISP 1 BLADDER

## (undated) DEVICE — CHLORAPREP HI-LITE 26ML ORANGE

## (undated) DEVICE — CURITY NON-ADHERENT STRIPS: Brand: CURITY

## (undated) DEVICE — INTENDED FOR TISSUE SEPARATION, AND OTHER PROCEDURES THAT REQUIRE A SHARP SURGICAL BLADE TO PUNCTURE OR CUT.: Brand: BARD-PARKER ® CARBON RIB-BACK BLADES

## (undated) DEVICE — CAST PADDING 4 IN SYNTHETIC NON-STRL

## (undated) DEVICE — 22.5 MM X 6.9 MM X 0.53 MM SAGITTAL BLADE

## (undated) DEVICE — 2000CC GUARDIAN II: Brand: GUARDIAN

## (undated) DEVICE — PLUMEPEN PRO 10FT

## (undated) DEVICE — NEEDLE 18 G X 1 1/2

## (undated) DEVICE — SUT ETHILON 4-0 PS-2 18 IN 1667H

## (undated) DEVICE — SUT VICRYL 3-0 PS-2 27 IN J427H

## (undated) DEVICE — U-DRAPE: Brand: CONVERTORS

## (undated) DEVICE — GLOVE SRG BIOGEL 7.5

## (undated) DEVICE — SCD SEQUENTIAL COMPRESSION COMFORT SLEEVE MEDIUM KNEE LENGTH: Brand: KENDALL SCD

## (undated) DEVICE — SUT VICRYL 4-0 PS-2 27 IN J426H

## (undated) DEVICE — 10FR FRAZIER SUCTION HANDLE: Brand: CARDINAL HEALTH

## (undated) DEVICE — BETHLEHEM UNIVERSAL  MIONR EXT: Brand: CARDINAL HEALTH

## (undated) DEVICE — SYRINGE 10ML LL